# Patient Record
Sex: FEMALE | Race: BLACK OR AFRICAN AMERICAN | Employment: FULL TIME | URBAN - NONMETROPOLITAN AREA
[De-identification: names, ages, dates, MRNs, and addresses within clinical notes are randomized per-mention and may not be internally consistent; named-entity substitution may affect disease eponyms.]

---

## 2017-02-09 ENCOUNTER — OFFICE VISIT (OUTPATIENT)
Dept: UROLOGY | Age: 47
End: 2017-02-09

## 2017-02-09 VITALS
BODY MASS INDEX: 21.5 KG/M2 | HEIGHT: 67 IN | DIASTOLIC BLOOD PRESSURE: 70 MMHG | SYSTOLIC BLOOD PRESSURE: 122 MMHG | WEIGHT: 137 LBS

## 2017-02-09 DIAGNOSIS — N13.30 HYDRONEPHROSIS, UNSPECIFIED HYDRONEPHROSIS TYPE: Primary | ICD-10-CM

## 2017-02-09 DIAGNOSIS — R31.29 MICROSCOPIC HEMATURIA: ICD-10-CM

## 2017-02-09 LAB
BILIRUBIN URINE: NEGATIVE
BLOOD URINE, POC: ABNORMAL
CHARACTER, URINE: CLEAR
COLOR, URINE: YELLOW
GLUCOSE URINE: NEGATIVE MG/DL
KETONES, URINE: 15
LEUKOCYTE CLUMPS, URINE: NEGATIVE
NITRITE, URINE: NEGATIVE
PH, URINE: 5.5
PROTEIN, URINE: NEGATIVE MG/DL
SPECIFIC GRAVITY, URINE: 1.02 (ref 1–1.03)
UROBILINOGEN, URINE: 0.2 EU/DL

## 2017-02-09 PROCEDURE — 81003 URINALYSIS AUTO W/O SCOPE: CPT | Performed by: NURSE PRACTITIONER

## 2017-02-09 PROCEDURE — 99999 URINALYSIS WITH MICROSCOPIC: CPT | Performed by: NURSE PRACTITIONER

## 2017-02-09 PROCEDURE — 99213 OFFICE O/P EST LOW 20 MIN: CPT | Performed by: NURSE PRACTITIONER

## 2017-02-09 RX ORDER — LISINOPRIL 10 MG/1
10 TABLET ORAL DAILY
COMMUNITY
End: 2017-09-06 | Stop reason: SDUPTHER

## 2017-02-10 LAB
APPEARANCE: CLEAR
BACTERIA: NEGATIVE PER HPF
BILIRUBIN: NEGATIVE
COLOR: YELLOW
EPITHELIAL CELLS: ABNORMAL PER HPF
GLUCOSE BLD-MCNC: NEGATIVE MG/DL
KETONES, URINE: ABNORMAL
LEUKOCYTE ESTERASE, URINE: NEGATIVE
NITRITE, URINE: NEGATIVE
OCCULT BLOOD,URINE: ABNORMAL
PH: 5.5 (ref 5–9)
PROTEIN, URINE: NEGATIVE
RBC: ABNORMAL PER HPF (ref 0–5)
SP GRAVITY MISCELLANEOUS: 1.02 (ref 1–1.03)
UROBILINOGEN, URINE: NORMAL
WBC: 0 PER HPF (ref 0–5)

## 2017-02-17 ENCOUNTER — TELEPHONE (OUTPATIENT)
Dept: UROLOGY | Age: 47
End: 2017-02-17

## 2017-09-06 ENCOUNTER — OFFICE VISIT (OUTPATIENT)
Dept: FAMILY MEDICINE CLINIC | Age: 47
End: 2017-09-06
Payer: COMMERCIAL

## 2017-09-06 VITALS
TEMPERATURE: 98.3 F | SYSTOLIC BLOOD PRESSURE: 134 MMHG | DIASTOLIC BLOOD PRESSURE: 84 MMHG | HEART RATE: 92 BPM | HEIGHT: 66 IN | WEIGHT: 136.6 LBS | RESPIRATION RATE: 12 BRPM | BODY MASS INDEX: 21.95 KG/M2

## 2017-09-06 DIAGNOSIS — K21.9 GASTROESOPHAGEAL REFLUX DISEASE, ESOPHAGITIS PRESENCE NOT SPECIFIED: ICD-10-CM

## 2017-09-06 DIAGNOSIS — Z12.39 SCREENING BREAST EXAMINATION: ICD-10-CM

## 2017-09-06 DIAGNOSIS — I10 ESSENTIAL HYPERTENSION: Primary | ICD-10-CM

## 2017-09-06 DIAGNOSIS — R42 VERTIGO: ICD-10-CM

## 2017-09-06 PROCEDURE — 99203 OFFICE O/P NEW LOW 30 MIN: CPT | Performed by: NURSE PRACTITIONER

## 2017-09-06 RX ORDER — PROMETHAZINE HYDROCHLORIDE 25 MG/1
25 TABLET ORAL EVERY 6 HOURS PRN
Qty: 12 TABLET | Refills: 2 | Status: SHIPPED | OUTPATIENT
Start: 2017-09-06 | End: 2018-07-26

## 2017-09-06 RX ORDER — ASPIRIN 81 MG/1
81 TABLET ORAL DAILY
Qty: 30 TABLET | Refills: 11 | Status: SHIPPED | OUTPATIENT
Start: 2017-09-06 | End: 2018-07-26

## 2017-09-06 RX ORDER — B-COMPLEX WITH VITAMIN C
1 TABLET ORAL DAILY
Qty: 30 TABLET | Refills: 11 | Status: SHIPPED | OUTPATIENT
Start: 2017-09-06 | End: 2018-07-26

## 2017-09-06 RX ORDER — LISINOPRIL 10 MG/1
10 TABLET ORAL DAILY
Qty: 30 TABLET | Refills: 6 | Status: SHIPPED | OUTPATIENT
Start: 2017-09-06 | End: 2018-05-07 | Stop reason: SDUPTHER

## 2017-09-06 RX ORDER — PROMETHAZINE HYDROCHLORIDE 25 MG/1
25 TABLET ORAL EVERY 6 HOURS PRN
COMMUNITY
End: 2017-09-06 | Stop reason: SDUPTHER

## 2017-09-06 RX ORDER — MECLIZINE HCL 12.5 MG/1
12.5 TABLET ORAL 3 TIMES DAILY PRN
Qty: 60 TABLET | Refills: 0 | Status: SHIPPED | OUTPATIENT
Start: 2017-09-06 | End: 2021-10-04 | Stop reason: SDUPTHER

## 2017-09-06 RX ORDER — OMEPRAZOLE 20 MG/1
20 CAPSULE, DELAYED RELEASE ORAL DAILY
Qty: 30 CAPSULE | Refills: 6 | Status: SHIPPED | OUTPATIENT
Start: 2017-09-06 | End: 2017-09-20 | Stop reason: ALTCHOICE

## 2017-09-06 ASSESSMENT — PATIENT HEALTH QUESTIONNAIRE - PHQ9
2. FEELING DOWN, DEPRESSED OR HOPELESS: 0
SUM OF ALL RESPONSES TO PHQ9 QUESTIONS 1 & 2: 0
SUM OF ALL RESPONSES TO PHQ QUESTIONS 1-9: 0
1. LITTLE INTEREST OR PLEASURE IN DOING THINGS: 0

## 2017-09-06 ASSESSMENT — ENCOUNTER SYMPTOMS
BACK PAIN: 0
RHINORRHEA: 0
DIARRHEA: 0
NAUSEA: 1
ABDOMINAL DISTENTION: 0
RECTAL PAIN: 0
VOMITING: 0
RESPIRATORY NEGATIVE: 1
ABDOMINAL PAIN: 0

## 2017-09-20 ENCOUNTER — OFFICE VISIT (OUTPATIENT)
Dept: FAMILY MEDICINE CLINIC | Age: 47
End: 2017-09-20
Payer: COMMERCIAL

## 2017-09-20 VITALS
SYSTOLIC BLOOD PRESSURE: 136 MMHG | WEIGHT: 136 LBS | HEIGHT: 66 IN | TEMPERATURE: 98.1 F | DIASTOLIC BLOOD PRESSURE: 64 MMHG | BODY MASS INDEX: 21.86 KG/M2 | RESPIRATION RATE: 10 BRPM | HEART RATE: 100 BPM

## 2017-09-20 DIAGNOSIS — R11.0 NAUSEA: ICD-10-CM

## 2017-09-20 DIAGNOSIS — R14.2 BELCHING: ICD-10-CM

## 2017-09-20 DIAGNOSIS — R10.13 EPIGASTRIC ABDOMINAL PAIN: ICD-10-CM

## 2017-09-20 DIAGNOSIS — Z12.4 PAPANICOLAOU SMEAR FOR CERVICAL CANCER SCREENING: Primary | ICD-10-CM

## 2017-09-20 DIAGNOSIS — H00.11 CHALAZION OF RIGHT UPPER EYELID: ICD-10-CM

## 2017-09-20 DIAGNOSIS — K21.9 GASTROESOPHAGEAL REFLUX DISEASE, ESOPHAGITIS PRESENCE NOT SPECIFIED: ICD-10-CM

## 2017-09-20 PROCEDURE — 99396 PREV VISIT EST AGE 40-64: CPT | Performed by: NURSE PRACTITIONER

## 2017-09-20 RX ORDER — PANTOPRAZOLE SODIUM 20 MG/1
20 TABLET, DELAYED RELEASE ORAL DAILY
Qty: 30 TABLET | Refills: 3 | Status: SHIPPED | OUTPATIENT
Start: 2017-09-20 | End: 2018-07-26

## 2017-09-25 LAB — GYNECOLOGY CYTOLOGY REPORT: NORMAL

## 2017-09-26 ENCOUNTER — TELEPHONE (OUTPATIENT)
Dept: FAMILY MEDICINE CLINIC | Age: 47
End: 2017-09-26

## 2017-12-11 ENCOUNTER — TELEPHONE (OUTPATIENT)
Dept: FAMILY MEDICINE CLINIC | Age: 47
End: 2017-12-11

## 2017-12-11 NOTE — TELEPHONE ENCOUNTER
12/11/17 dolv:  9/20/17  Patient requesting appointment after 2:30 this week for diarrhea since 12/1/17. Please advise as schedule is full.   Thanks/blm

## 2017-12-12 ENCOUNTER — OFFICE VISIT (OUTPATIENT)
Dept: FAMILY MEDICINE CLINIC | Age: 47
End: 2017-12-12
Payer: COMMERCIAL

## 2017-12-12 ENCOUNTER — TELEPHONE (OUTPATIENT)
Dept: FAMILY MEDICINE CLINIC | Age: 47
End: 2017-12-12

## 2017-12-12 VITALS
TEMPERATURE: 98.5 F | BODY MASS INDEX: 21.86 KG/M2 | DIASTOLIC BLOOD PRESSURE: 80 MMHG | SYSTOLIC BLOOD PRESSURE: 136 MMHG | HEIGHT: 66 IN | RESPIRATION RATE: 14 BRPM | WEIGHT: 136 LBS | HEART RATE: 64 BPM

## 2017-12-12 DIAGNOSIS — R19.7 DIARRHEA, UNSPECIFIED TYPE: ICD-10-CM

## 2017-12-12 DIAGNOSIS — K52.9 ACUTE GASTROENTERITIS: Primary | ICD-10-CM

## 2017-12-12 DIAGNOSIS — R25.2 MUSCLE CRAMP: ICD-10-CM

## 2017-12-12 DIAGNOSIS — Z13.220 SCREENING CHOLESTEROL LEVEL: ICD-10-CM

## 2017-12-12 DIAGNOSIS — R11.0 NAUSEA: ICD-10-CM

## 2017-12-12 PROCEDURE — 99213 OFFICE O/P EST LOW 20 MIN: CPT | Performed by: NURSE PRACTITIONER

## 2017-12-12 ASSESSMENT — ENCOUNTER SYMPTOMS
CONSTIPATION: 0
NAUSEA: 1
DIARRHEA: 1
ABDOMINAL DISTENTION: 0
VOMITING: 0
RESPIRATORY NEGATIVE: 1
ABDOMINAL PAIN: 1

## 2017-12-12 NOTE — TELEPHONE ENCOUNTER
Pt scheduled today 12/12/17 at 5:00 pm with Jassi Banuelos. Per Justin's request, called pt to see if she can at this time, or when she receives the voice mail I left for her on her [address]   (ok to leave detailed msg on mach per signed HIPAA)

## 2017-12-12 NOTE — PROGRESS NOTES
[address]  [address] [address]  Dept: [phone]  Dept Fax: [phone]  Loc: [phone]    Gilmar Eldridge is a 49 y.o. female who presents today for her medical conditions/complaints as noted below. Gilmar Eldridge is c/o of Diarrhea (abd pain, diarrhea, nausea, gas started Dec 1 )      HPI:     Pt here with new onset diarrhea and nausea for the last 4 days. She states she started having symptoms 4 days ago. She denies a fever chills or sweats. She states she has not vomited. She is having muscle cramps in her legs. She did state this am she had a soft formed stool. She ate breakfast and lunch and kept them down and did not have diarrhea. She continues to have some nausea with stomach discomfort. She slept through the night last night without getting awakened by the diarrhea. She denies eating any place new or different. Current Outpatient Prescriptions   Medication Sig Dispense Refill    loperamide (IMODIUM) 1 MG/5ML solution Take 5 mLs by mouth 4 times daily as needed for Diarrhea 100 mL 0    pantoprazole (PROTONIX) 20 MG tablet Take 1 tablet by mouth daily 30 tablet 3    Erythromycin 2 % OINT Apply 1 applicator topically 2 times daily 25 g 1    promethazine (PHENERGAN) 25 MG tablet Take 1 tablet by mouth every 6 hours as needed for Nausea 12 tablet 2    meclizine (ANTIVERT) 12.5 MG tablet Take 1 tablet by mouth 3 times daily as needed (vertigo) 60 tablet 0    lisinopril (PRINIVIL;ZESTRIL) 10 MG tablet Take 1 tablet by mouth daily 30 tablet 6    Calcium Carbonate-Vitamin D (OYSTER SHELL CALCIUM/D) 500-200 MG-UNIT TABS Take 1 tablet by mouth daily 30 tablet 11    aspirin EC 81 MG EC tablet Take 1 tablet by mouth daily 30 tablet 11    lidocaine (XYLOCAINE) 5 % ointment Apply topically as needed.  50 g 0    Probiotic Product (PROBIOTIC DAILY PO) Take by mouth       No current facility-administered medications for this visit. Past Medical History:   Diagnosis Date    Acid reflux     Gouty arthritis     Hypertension       Past Surgical History:   Procedure Laterality Date    FINGER TRIGGER RELEASE      INNER EAR SURGERY  2015    PARTIAL HYSTERECTOMY       Family History   Problem Relation Age of Onset    Cancer Mother     Heart Disease Father      Social History   Substance Use Topics    Smoking status: Never Smoker    Smokeless tobacco: Not on file    Alcohol use Not on file        No Known Allergies    Health Maintenance   Topic Date Due    HIV screen  09/07/1984    Lipid screen  09/07/2009    Flu vaccine (1) 09/01/2017    Cervical cancer screen  09/25/2020    DTaP/Tdap/Td vaccine (2 - Td) 04/06/2024       Subjective:      Review of Systems   Respiratory: Negative. Cardiovascular: Negative. Gastrointestinal: Positive for abdominal pain, diarrhea and nausea. Negative for abdominal distention, constipation and vomiting. Skin: Negative. Psychiatric/Behavioral: Negative for self-injury, sleep disturbance and suicidal ideas. Objective:     /80   Pulse 64   Temp 98.5 °F (36.9 °C) (Oral)   Resp 14   Ht 5' 6\" (1.676 m)   Wt 136 lb (61.7 kg)   BMI 21.95 kg/m²     Physical Exam   Constitutional: She appears well-developed and well-nourished. No distress. Cardiovascular: Normal rate, regular rhythm, normal heart sounds and intact distal pulses. No murmur heard. Pulmonary/Chest: Effort normal and breath sounds normal.   Abdominal: Normal appearance. She exhibits no shifting dullness, no distension, no pulsatile liver, no fluid wave, no abdominal bruit, no ascites, no pulsatile midline mass and no mass. Bowel sounds are increased. There is no tenderness. There is no rigidity, no guarding, no tenderness at McBurney's point and negative Woods's sign. Skin:   Mucous membranes moist, skin turgor 2+ no tenting noted   Nursing note and vitals reviewed. Assessment/Plan:          1. Acute gastroenteritis  BRAT diet, continue protonix  Eat yogurt    2. Nausea  Nausea meds if needed    3. Diarrhea, unspecified type    - loperamide (IMODIUM) 1 MG/5ML solution; Take 5 mLs by mouth 4 times daily as needed for Diarrhea  Dispense: 100 mL; Refill: 0  If symptoms no better in 2 days contact office. 4. Muscle cramp  Continue to keep hydrated with fluid intake    Patient Instructions     You may receive a survey about your visit with us today. The feedback from our patients helps us identify what is working well and where the service to all patients can be enhanced. Thank you! Patient Education        Colitis: Care Instructions  Your Care Instructions  Colitis is the medical term for swelling (inflammation) of the intestine. It can be caused by different things, such as an infection or loss of blood flow in the intestine. Other causes are problems like Crohn's disease or ulcerative colitis. Symptoms may include fever, diarrhea that may be bloody, or belly pain. Sometimes symptoms go away without treatment. But you may need treatment or more tests, such as blood tests or a stool test. Or you may need imaging tests like a CT scan or a colonoscopy. In some cases, the doctor may want to test a sample of tissue from the intestine. This test is called a biopsy. The doctor has checked you carefully, but problems can develop later. If you notice any problems or new symptoms, get medical treatment right away. Follow-up care is a key part of your treatment and safety. Be sure to make and go to all appointments, and call your doctor if you are having problems. It's also a good idea to know your test results and keep a list of the medicines you take. How can you care for yourself at home? · Rest until you feel better. · Your doctor may recommend that you eat bland foods. These include rice, dry toast or crackers, bananas, and applesauce. · To prevent dehydration, drink plenty of fluids.  Choose water and other caffeine-free clear liquids until you feel better. If you have kidney, heart, or liver disease and have to limit fluids, talk with your doctor before you increase the amount of fluids you drink. · Be safe with medicines. Take your medicines exactly as prescribed. Call your doctor if you think you are having a problem with your medicine. You will get more details on the specific medicines your doctor prescribes. When should you call for help? Call 911 anytime you think you may need emergency care. For example, call if:  ? · You passed out (lost consciousness). ? · Your stools are maroon or very bloody. ?Call your doctor now or seek immediate medical care if:  ? · You have new or worse belly pain. ? · You have a fever. ? · You are vomiting. ? · You cannot pass stools or gas. ? · You have new or more blood in your stools. ? Watch closely for changes in your health, and be sure to contact your doctor if:  ? · You have new or worse symptoms. ? · You are losing weight. ? · You do not get better as expected. Where can you learn more? Go to https://[employer]. org and sign in to your [employer] account. Enter H027 in the XZERES box to learn more about \"Colitis: Care Instructions. \"     If you do not have an account, please click on the \"Sign Up Now\" link. Current as of: May 12, 2017  Content Version: 11.4  © 5868-1257 [employer]. Care instructions adapted under license by [facility] ([facility]). If you have questions about a medical condition or this instruction, always ask your healthcare professional. [place] [address] any warranty or liability for your use of this information. Advised to eat a BRAT diet and eat yogurt and drink warm coke. Return if symptoms worsen or fail to improve.     Reccommended tobacco cessation options including pharmacologic methods, counseled great than 3 minutes during this visit:  Yes  []  No  [] Patient given educational materials - see patient instructions. Discussed use, benefit, and side effects of prescribed medications. All patient questions answered. Pt voiced understanding. Reviewed health maintenance. Instructed to continue current medications, diet and exercise. Patient agreed with treatment plan. Follow up as directed.        Electronically signed by Radha Londono CNP on 12/12/2017 at 5:15 PM

## 2017-12-12 NOTE — LETTER
[address]  [address] [address]  Phone: [phone]  Fax: [phone]    Aspen Gamboa CNP        December 12, 2017     Patient: Arturo Díaz   YOB: 1970   Date of Visit: 12/12/2017       To Whom It May Concern: It is my medical opinion that Levi Eva please excuse patient from work on Wednesday December 13th, 2017. .    If you have any questions or concerns, please don't hesitate to call.     Sincerely,        Aspen Gamboa CNP

## 2017-12-14 ENCOUNTER — TELEPHONE (OUTPATIENT)
Dept: FAMILY MEDICINE CLINIC | Age: 47
End: 2017-12-14

## 2017-12-14 NOTE — LETTER
[address] [address]  Phone: [phone]  Fax: [phone]    Sulma Parker CNP        December 14, 2017     Patient: Floridalma Rodas   YOB: 1970   Date of Visit: 12/14/2017       To Whom It May Concern: It is my medical opinion that Solange Peraza please excuse patient from work on Thursday December 14th, and Friday December 15th, 2017. She may return t owork on Monday December 18th, 2017. If you have any questions or concerns, please don't hesitate to call.     Sincerely,        Sulma Parker CNP

## 2017-12-14 NOTE — TELEPHONE ENCOUNTER
Calling back to update sx  Still have some sx  But are getting better, requesting  Work slip for  thur  And fri day off 12/14/17/ an d 12/15 and return to work on Monday fx: [phone] [facility].

## 2017-12-18 ENCOUNTER — TELEPHONE (OUTPATIENT)
Dept: FAMILY MEDICINE CLINIC | Age: 47
End: 2017-12-18

## 2017-12-18 DIAGNOSIS — R19.7 DIARRHEA OF PRESUMED INFECTIOUS ORIGIN: Primary | ICD-10-CM

## 2017-12-18 NOTE — LETTER
[address]  [address] [address]  Phone: [phone]  Fax: [phone]    Marni Leal        December 18, 2017     Patient: Devang Hercules   YOB: 1970   Date of Visit: 12/18/2017       To Whom it May Concern:    Zach Osuna was seen in my clinic on 12/12/17. She may return to work on 12/20/17. If you have any questions or concerns, please don't hesitate to call.     Sincerely,         Justin Joya,CNP

## 2017-12-19 ENCOUNTER — TELEPHONE (OUTPATIENT)
Dept: FAMILY MEDICINE CLINIC | Age: 47
End: 2017-12-19

## 2017-12-19 LAB
ABSOLUTE BASO #: 0.1 K/UL (ref 0–0.1)
ABSOLUTE EOS #: 0.8 K/UL (ref 0.1–0.4)
ABSOLUTE LYMPH #: 2.3 K/UL (ref 0.8–5.2)
ABSOLUTE MONO #: 0.3 K/UL (ref 0.1–0.9)
ABSOLUTE NEUT #: 1.6 K/UL (ref 1.3–9.1)
ANION GAP SERPL CALCULATED.3IONS-SCNC: 18 MMOL/L
BASOPHILS RELATIVE PERCENT: 1.2 %
BUN BLDV-MCNC: 5 MG/DL (ref 10–20)
CALCIUM SERPL-MCNC: 10.1 MG/DL (ref 8.7–10.8)
CHLORIDE BLD-SCNC: 103 MMOL/L (ref 95–111)
CO2: 29 MMOL/L (ref 21–32)
CREAT SERPL-MCNC: 0.7 MG/DL (ref 0.5–1.3)
EGFR AFRICAN AMERICAN: 108
EGFR IF NONAFRICAN AMERICAN: 89
EOSINOPHILS RELATIVE PERCENT: 15.7 %
GLUCOSE: 83 MG/DL (ref 70–100)
HCT VFR BLD CALC: 38.7 % (ref 36–48)
HEMOGLOBIN: 12.9 G/DL (ref 12–16)
LYMPHOCYTE %: 44.2 %
MCH RBC QN AUTO: 27.4 PG (ref 27–34)
MCHC RBC AUTO-ENTMCNC: 33.3 G/DL (ref 31–36)
MCV RBC AUTO: 82.2 FL (ref 80–100)
MONOCYTES # BLD: 6.7 %
NEUTROPHILS RELATIVE PERCENT: 31.8 %
PDW BLD-RTO: 13.7 % (ref 10.8–14.8)
PLATELETS: 261 K/UL (ref 150–450)
POTASSIUM SERPL-SCNC: 3.8 MMOL/L (ref 3.5–5.4)
RBC: 4.71 M/UL (ref 4–5.5)
SODIUM BLD-SCNC: 146 MMOL/L (ref 134–147)
WBC: 5.1 K/UL (ref 3.7–10.8)

## 2017-12-21 ENCOUNTER — TELEPHONE (OUTPATIENT)
Dept: FAMILY MEDICINE CLINIC | Age: 47
End: 2017-12-21

## 2017-12-21 ENCOUNTER — HOSPITAL ENCOUNTER (OUTPATIENT)
Age: 47
Discharge: HOME | End: 2017-12-21
Payer: COMMERCIAL

## 2017-12-21 LAB
ADENOVIRUS F 40 41 PCR: NOT DETECTED
ASTROVIRUS PCR: NOT DETECTED
CAMPYLOBACTER PCR: NOT DETECTED
CLOSTRIDIUM DIFFICILE, PCR: NOT DETECTED
CRYPTOSPORIDIUM PCR: NOT DETECTED
CYCLOSPORA CAYETANENSIS PCR: NOT DETECTED
E COLI 0157 PCR: NORMAL
E COLI ENTEROAGGREGATIVE PCR: NOT DETECTED
E COLI ENTEROPATHOGENIC PCR: NOT DETECTED
E COLI ENTEROTOXIGENIC PCR: NOT DETECTED
E COLI SHIGA LIKE TOXIN PCR: NOT DETECTED
E COLI SHIGELLA/ENTEROINVASIVE PCR: NOT DETECTED
E HISTOLYTICA GI FILM ARRAY: NOT DETECTED
GIARDIA LAMBLIA PCR: NOT DETECTED
NOROVIRUS GI GII PCR: NOT DETECTED
PLESIOMONAS SHIGELLOIDES PCR: NOT DETECTED
ROTAVIRUS A PCR: NOT DETECTED
SALMONELLA PCR: NOT DETECTED
SAPOVIRUS PCR: NOT DETECTED
VIBRIO CHOLERAE PCR: NOT DETECTED
VIBRIO PCR: NOT DETECTED
YERSINIA ENTEROCOLITICA PCR: NOT DETECTED

## 2017-12-21 PROCEDURE — 87507 IADNA-DNA/RNA PROBE TQ 12-25: CPT

## 2017-12-21 NOTE — TELEPHONE ENCOUNTER
Pt called in stating that she was going to go back to work today,but she was up all night again with diarrhea. She is requesting that Justin write her 1 more note to excuse her from work tonight and she will return tomorrow. Pt also states that she took her stool culture to the lab today so her results should be in soon. Please advise.        Letter can be faxed to her work attn: Raysa Durán @[phone]

## 2018-03-21 ENCOUNTER — OFFICE VISIT (OUTPATIENT)
Dept: UROLOGY | Age: 48
End: 2018-03-21
Payer: COMMERCIAL

## 2018-03-21 VITALS
SYSTOLIC BLOOD PRESSURE: 122 MMHG | HEIGHT: 67 IN | WEIGHT: 134.8 LBS | BODY MASS INDEX: 21.16 KG/M2 | DIASTOLIC BLOOD PRESSURE: 76 MMHG

## 2018-03-21 DIAGNOSIS — R31.29 MICROSCOPIC HEMATURIA: ICD-10-CM

## 2018-03-21 DIAGNOSIS — N13.30 HYDRONEPHROSIS, UNSPECIFIED HYDRONEPHROSIS TYPE: Primary | ICD-10-CM

## 2018-03-21 LAB
BILIRUBIN URINE: NEGATIVE
BLOOD URINE, POC: ABNORMAL
CHARACTER, URINE: CLEAR
COLOR, URINE: YELLOW
GLUCOSE URINE: NEGATIVE MG/DL
KETONES, URINE: ABNORMAL
LEUKOCYTE CLUMPS, URINE: ABNORMAL
NITRITE, URINE: NEGATIVE
PH, URINE: 6
PROTEIN, URINE: NEGATIVE MG/DL
SPECIFIC GRAVITY, URINE: 1.02 (ref 1–1.03)
UROBILINOGEN, URINE: 1 EU/DL

## 2018-03-21 PROCEDURE — 81003 URINALYSIS AUTO W/O SCOPE: CPT | Performed by: NURSE PRACTITIONER

## 2018-03-21 PROCEDURE — 99213 OFFICE O/P EST LOW 20 MIN: CPT | Performed by: NURSE PRACTITIONER

## 2018-03-21 NOTE — PROGRESS NOTES
Radial pulses are 2+/4 bilateral and equal. Posterior tibialis 2+/4 bilateral and equal  Pulmonary/Chest:      Chest symmetric with normal A/P diameter,  CTA with no wheezes, rales, or rhonchi noted. Normal respiratory rate and rhthym. No use of accessory muscles. Abdominal:         Soft. No tenderness. No rebound, no guarding and no CVA tenderness. Bowel sounds present. Musculoskeletal:         Normal range of motion. No edema or tenderness of lower extremities. Lymphadenopathy:        No cervical adenopathy. Bilateral supraclavicular adenopathy absent. Extremities: No cyanosis, clubbing, or edema present. Neurological:        Alert and oriented. No cranial nerve deficit. There are no focalizing motor or sensory deficits. CN II-XII are grossly intact. Psychiatric:        Normal mood and affect. Labs   Urine dip demonstrates   Results for POC orders placed in visit on 03/21/18   POCT Urinalysis No Micro (Auto)   Result Value Ref Range    Glucose, Ur Negative NEGATIVE mg/dl    Bilirubin Urine Negative     Ketones, Urine Trace (A) NEGATIVE    Specific Gravity, Urine 1.020 1.002 - 1.03    Blood, UA POC Small (A) NEGATIVE    pH, Urine 6.00 5.0 - 9.0    Protein, Urine Negative NEGATIVE mg/dl    Urobilinogen, Urine 1.00 0.0 - 1.0 eu/dl    Nitrite, Urine Negative NEGATIVE    Leukocyte Clumps, Urine Trace (A) NEGATIVE    Color, Urine Yellow YELLOW-STR    Character, Urine Clear [id number]       Patients recent PSA values are as follows  No results found for: PSA, PSADIA     Recent BUN/Creatinine:  Lab Results   Component Value Date    BUN 5 12/18/2017    CREATININE 0.7 12/18/2017            Radiology  The patient has had a Renal Ultrasound which I have reviewed accompanying report. The study demonstrates right pelvic kidney. No hydronephrosis.       information found for this patient   Narrative   Ordering Provider: Nayla Hare [facility]   Dee [address]          Radiology

## 2018-03-22 ENCOUNTER — TELEPHONE (OUTPATIENT)
Dept: UROLOGY | Age: 48
End: 2018-03-22

## 2018-03-22 LAB
APPEARANCE: CLEAR
BILIRUBIN: NEGATIVE
COLOR: YELLOW
GLUCOSE BLD-MCNC: NEGATIVE MG/DL
KETONES, URINE: NEGATIVE
LEUKOCYTE ESTERASE, URINE: NEGATIVE
NITRITE, URINE: NEGATIVE
OCCULT BLOOD,URINE: NEGATIVE
PH: 6.5 (ref 5–9)
PROTEIN, URINE: NEGATIVE
SP GRAVITY MISCELLANEOUS: 1.02 (ref 1–1.03)
UROBILINOGEN, URINE: NORMAL

## 2018-03-22 NOTE — TELEPHONE ENCOUNTER
Patient [facility] Patient voiced understanding and was very appreciative of the call back.

## 2018-05-07 ENCOUNTER — OFFICE VISIT (OUTPATIENT)
Dept: FAMILY MEDICINE CLINIC | Age: 48
End: 2018-05-07
Payer: COMMERCIAL

## 2018-05-07 VITALS
WEIGHT: 129 LBS | RESPIRATION RATE: 12 BRPM | SYSTOLIC BLOOD PRESSURE: 134 MMHG | TEMPERATURE: 98.4 F | BODY MASS INDEX: 20.73 KG/M2 | HEIGHT: 66 IN | DIASTOLIC BLOOD PRESSURE: 82 MMHG | HEART RATE: 54 BPM

## 2018-05-07 DIAGNOSIS — Z11.4 SCREENING FOR HIV (HUMAN IMMUNODEFICIENCY VIRUS): Primary | ICD-10-CM

## 2018-05-07 DIAGNOSIS — I10 ESSENTIAL HYPERTENSION: ICD-10-CM

## 2018-05-07 PROCEDURE — 99213 OFFICE O/P EST LOW 20 MIN: CPT | Performed by: NURSE PRACTITIONER

## 2018-05-07 RX ORDER — LISINOPRIL 10 MG/1
10 TABLET ORAL DAILY
Qty: 30 TABLET | Refills: 6 | Status: SHIPPED | OUTPATIENT
Start: 2018-05-07 | End: 2018-11-20 | Stop reason: SDUPTHER

## 2018-05-07 ASSESSMENT — ENCOUNTER SYMPTOMS: RESPIRATORY NEGATIVE: 1

## 2018-05-09 ENCOUNTER — TELEPHONE (OUTPATIENT)
Dept: FAMILY MEDICINE CLINIC | Age: 48
End: 2018-05-09

## 2018-05-09 LAB
CHOLESTEROL/HDL RATIO: 2.3
CHOLESTEROL: 221 MG/DL
HDLC SERPL-MCNC: 97 MG/DL
HIV 1,2 COMBO ANTIGEN/ANTIBODY: NORMAL
LDL CHOLESTEROL CALCULATED: 117 MG/DL
LDL/HDL RATIO: 1.2
TRIGL SERPL-MCNC: 35 MG/DL
VLDLC SERPL CALC-MCNC: 7 MG/DL

## 2018-07-26 ENCOUNTER — HOSPITAL ENCOUNTER (EMERGENCY)
Dept: GENERAL RADIOLOGY | Age: 48
Discharge: HOME | End: 2018-07-26
Payer: COMMERCIAL

## 2018-07-26 ENCOUNTER — HOSPITAL ENCOUNTER (EMERGENCY)
Age: 48
Discharge: HOME | End: 2018-07-26
Payer: COMMERCIAL

## 2018-07-26 VITALS
HEART RATE: 68 BPM | DIASTOLIC BLOOD PRESSURE: 63 MMHG | TEMPERATURE: 97.3 F | HEIGHT: 67 IN | OXYGEN SATURATION: 98 % | SYSTOLIC BLOOD PRESSURE: 132 MMHG | BODY MASS INDEX: 20.43 KG/M2 | RESPIRATION RATE: 18 BRPM | WEIGHT: 130.13 LBS

## 2018-07-26 DIAGNOSIS — S86.911A KNEE STRAIN, RIGHT, INITIAL ENCOUNTER: Primary | ICD-10-CM

## 2018-07-26 PROCEDURE — 73564 X-RAY EXAM KNEE 4 OR MORE: CPT

## 2018-07-26 PROCEDURE — 99213 OFFICE O/P EST LOW 20 MIN: CPT | Performed by: NURSE PRACTITIONER

## 2018-07-26 PROCEDURE — 2709999900 HC NON-CHARGEABLE SUPPLY

## 2018-07-26 PROCEDURE — 99214 OFFICE O/P EST MOD 30 MIN: CPT

## 2018-07-26 RX ORDER — IBUPROFEN 400 MG/1
800 TABLET ORAL EVERY 8 HOURS PRN
Qty: 120 TABLET | Refills: 0 | Status: SHIPPED | OUTPATIENT
Start: 2018-07-26 | End: 2018-08-20 | Stop reason: ALTCHOICE

## 2018-07-26 ASSESSMENT — ENCOUNTER SYMPTOMS
CONSTIPATION: 0
RHINORRHEA: 0
BACK PAIN: 0
APNEA: 0
VOMITING: 0
SINUS PRESSURE: 0
COUGH: 0
SHORTNESS OF BREATH: 0
DIARRHEA: 0
SORE THROAT: 0
ABDOMINAL PAIN: 0
PHOTOPHOBIA: 0
NAUSEA: 0

## 2018-07-26 ASSESSMENT — PAIN DESCRIPTION - DESCRIPTORS: DESCRIPTORS: ACHING

## 2018-07-26 ASSESSMENT — PAIN DESCRIPTION - LOCATION: LOCATION: KNEE

## 2018-07-26 ASSESSMENT — PAIN DESCRIPTION - ORIENTATION: ORIENTATION: LEFT

## 2018-07-26 ASSESSMENT — PAIN SCALES - GENERAL: PAINLEVEL_OUTOF10: 7

## 2018-07-26 NOTE — ED TRIAGE NOTES
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 Pt rates left knee pain 6/10 at rest. Pt also has  knee swelling.

## 2018-07-26 NOTE — ED NOTES
An After Visit Summary was printed, reviewed with pt and given to. Pt informed that rx's at pharmacy ready for . 3 inch ace wrap applied to right knee upon discharge.      Alanna Hartman, KAYEN  48/96/46 [phone]

## 2018-08-20 ENCOUNTER — OFFICE VISIT (OUTPATIENT)
Dept: FAMILY MEDICINE CLINIC | Age: 48
End: 2018-08-20
Payer: COMMERCIAL

## 2018-08-20 VITALS
TEMPERATURE: 98.5 F | HEART RATE: 72 BPM | SYSTOLIC BLOOD PRESSURE: 150 MMHG | DIASTOLIC BLOOD PRESSURE: 84 MMHG | RESPIRATION RATE: 16 BRPM | WEIGHT: 135.8 LBS | BODY MASS INDEX: 21.27 KG/M2

## 2018-08-20 DIAGNOSIS — R53.83 OTHER FATIGUE: Primary | ICD-10-CM

## 2018-08-20 DIAGNOSIS — H57.89 EYE REDNESS: ICD-10-CM

## 2018-08-20 DIAGNOSIS — R25.1 TREMOR OF BOTH HANDS: ICD-10-CM

## 2018-08-20 DIAGNOSIS — R42 VERTIGO: ICD-10-CM

## 2018-08-20 DIAGNOSIS — L65.9 HAIR LOSS: ICD-10-CM

## 2018-08-20 PROCEDURE — 99213 OFFICE O/P EST LOW 20 MIN: CPT | Performed by: NURSE PRACTITIONER

## 2018-08-20 RX ORDER — ACETAMINOPHEN 500 MG
1000 TABLET ORAL PRN
COMMUNITY
End: 2019-08-01 | Stop reason: ALTCHOICE

## 2018-08-20 ASSESSMENT — ENCOUNTER SYMPTOMS
RESPIRATORY NEGATIVE: 1
ABDOMINAL DISTENTION: 0

## 2018-08-20 NOTE — PROGRESS NOTES
[address]  [address] [address]  Dept: [phone]  Dept Fax: [phone]  Loc: [phone]    Ghanshyam Cedeño is a 49 y.o. female who presents today for her medical conditions/complaints as noted below. Ghanshyam Cedeño is c/o of Fatigue (C/o feeling \"extremely tired\" since last Tuesday. Also c/o losing hair more over the last 6 months.); Anxiety (C/o feeling nervous \"for no reason\", occurs during the day and at night sometimes. Also notes feeling SOB and \"feeling in a fog, hard to concentrate sometimes\".  ); Tremors (C/o occasional hand tremors.  ); Dizziness (C/o feeling dizzy on and off since last Tuesday, in addition to intermittent Vertigo symptoms.); and Eye Problem (C/o Right eye redness x 1 week. Denies pain, drainage or itching.  )      HPI:     Pt here with multiple complaints today. She states her hair is falling out in clumps,  Denies any hair loss of scalp, she has been feeling nervous lately and having hand tremors and feeling dizzy off and on. She states the symptoms started last week. She is concerned about her thyroid gland and also she has felt fatigued. She will have to come home after work and take a a nap some day. denies any blood loss anywhere. Her inner right eye has bee red. Not itching, nothing in it, draining clear, not matted shut in am.         Current Outpatient Prescriptions   Medication Sig Dispense Refill    acetaminophen (APAP EXTRA STRENGTH) 500 MG tablet Take 1,000 mg by mouth as needed for Pain      lisinopril (PRINIVIL;ZESTRIL) 10 MG tablet Take 1 tablet by mouth daily 30 tablet 6    meclizine (ANTIVERT) 12.5 MG tablet Take 1 tablet by mouth 3 times daily as needed (vertigo) 60 tablet 0     No current facility-administered medications for this visit.         Past Medical History:   Diagnosis Date    Acid reflux     Gouty arthritis     Hypertension       Past Surgical History: Procedure Laterality Date    FINGER TRIGGER RELEASE      INNER EAR SURGERY  2015    PARTIAL HYSTERECTOMY      UPPER GASTROINTESTINAL ENDOSCOPY  10/06/2017     Family History   Problem Relation Age of Onset    Cancer Mother     Heart Disease Father      Social History   Substance Use Topics    Smoking status: Never Smoker    Smokeless tobacco: Never Used    Alcohol use No        No Known Allergies    Health Maintenance   Topic Date Due    Flu vaccine (1) 09/01/2018    Potassium monitoring  12/18/2018    Creatinine monitoring  12/18/2018    Cervical cancer screen  09/25/2020    Lipid screen  05/08/2023    DTaP/Tdap/Td vaccine (2 - Td) 04/06/2024    HIV screen  Completed       Subjective:      Review of Systems   Constitutional: Positive for fatigue. Negative for diaphoresis and fever. Respiratory: Negative. Cardiovascular: Negative. Gastrointestinal: Negative for abdominal distention. Genitourinary: Negative for difficulty urinating, dyspareunia and menstrual problem. Musculoskeletal: Negative for arthralgias. Skin: Negative. Neurological: Positive for dizziness, tremors and numbness. Negative for seizures, syncope, facial asymmetry, speech difficulty, weakness, light-headedness and headaches. Psychiatric/Behavioral: Negative for self-injury, sleep disturbance and suicidal ideas. The patient is nervous/anxious and is hyperactive. Objective:     BP (!) 150/84 (Site: Left Arm, Position: Sitting, Cuff Size: Medium Adult)   Pulse 72   Temp 98.5 °F (36.9 °C) (Oral)   Resp 16   Wt 135 lb 12.8 oz (61.6 kg)   BMI 21.27 kg/m²     Physical Exam   Constitutional: She appears well-developed and well-nourished. No distress.    HENT:   Right Ear: Hearing, tympanic membrane, external ear and ear canal normal.   Left Ear: Hearing, tympanic membrane, external ear and ear canal normal.   Nose: Nose normal.   Mouth/Throat: Oropharynx is clear and moist.   Scalp with a few bald spots at base of neck, no circular pattern noted, no itching noted   Eyes: Pupils are equal, round, and reactive to light. Conjunctivae and EOM are normal.   Right inner sclera with erythema, upper and lower eyelid normal no erythema or edema. Cardiovascular: Normal rate, regular rhythm, normal heart sounds and intact distal pulses. No murmur heard. Pulmonary/Chest: Effort normal and breath sounds normal. No respiratory distress. She has no wheezes. Abdominal: Soft. Bowel sounds are normal. She exhibits no distension. Skin: Skin is warm and dry. Psychiatric: Judgment and thought content normal. Her mood appears anxious. Her speech is rapid and/or pressured. She is hyperactive. Cognition and memory are normal.   Nursing note and vitals reviewed. Assessment/Plan:          1. Other fatigue    - CBC Auto Differential; Future  - Comprehensive Metabolic Panel; Future  - Lipid Panel; Future  - Vitamin B12 & Folate; Future  - Vitamin D 25 Hydroxy; Future  - TSH without Reflex; Future  - T4, Free; Future  - Iron; Future  - IRON SATURATION; Future  - Ferritin; Future  - Vitamin B6; Future  - Total Iron Binding Capacity; Future    2. Tremor of both hands    - Comprehensive Metabolic Panel; Future  - Lipid Panel; Future  - Vitamin B12 & Folate; Future  - TSH without Reflex; Future  - T4, Free; Future  - Ferritin; Future  - Vitamin B6; Future    3. Hair loss    - Comprehensive Metabolic Panel; Future  - Vitamin B12 & Folate; Future  - TSH without Reflex; Future  - T4, Free; Future    4. Vertigo    Monitor  Take antivert if needed  Await results  5. Eye redness    Visine to right eye    Return in about 2 weeks (around 9/3/2018) for to discuss test results. Reccommended tobacco cessation options including pharmacologic methods, counseled great than 3 minutes during this visit:  Yes  []  No  []       Patient given educational materials - see patient instructions.   Discussed use, benefit, and side effects of prescribed medications. All patient questions answered. Pt voiced understanding. Reviewed health maintenance. Instructed to continue current medications, diet and exercise. Patient agreed with treatment plan. Follow up as directed.        Electronically signed by JOON Bowman CNP on 8/20/2018 at 12:55 PM

## 2018-08-21 ENCOUNTER — NURSE ONLY (OUTPATIENT)
Dept: LAB | Age: 48
End: 2018-08-21

## 2018-08-21 DIAGNOSIS — R53.83 OTHER FATIGUE: ICD-10-CM

## 2018-08-21 DIAGNOSIS — R25.1 TREMOR OF BOTH HANDS: ICD-10-CM

## 2018-08-21 DIAGNOSIS — L65.9 HAIR LOSS: ICD-10-CM

## 2018-08-21 LAB
ALBUMIN SERPL-MCNC: 5 G/DL (ref 3.5–5.1)
ALP BLD-CCNC: 62 U/L (ref 38–126)
ALT SERPL-CCNC: 11 U/L (ref 11–66)
ANION GAP SERPL CALCULATED.3IONS-SCNC: 13 MEQ/L (ref 8–16)
AST SERPL-CCNC: 18 U/L (ref 5–40)
BASOPHILS # BLD: 1.2 %
BASOPHILS ABSOLUTE: 0.1 THOU/MM3 (ref 0–0.1)
BILIRUB SERPL-MCNC: 0.5 MG/DL (ref 0.3–1.2)
BUN BLDV-MCNC: 7 MG/DL (ref 7–22)
CALCIUM SERPL-MCNC: 10.2 MG/DL (ref 8.5–10.5)
CHLORIDE BLD-SCNC: 100 MEQ/L (ref 98–111)
CHOLESTEROL, TOTAL: 207 MG/DL (ref 100–199)
CO2: 28 MEQ/L (ref 23–33)
CREAT SERPL-MCNC: 0.5 MG/DL (ref 0.4–1.2)
EOSINOPHIL # BLD: 1.6 %
EOSINOPHILS ABSOLUTE: 0.1 THOU/MM3 (ref 0–0.4)
ERYTHROCYTE [DISTWIDTH] IN BLOOD BY AUTOMATED COUNT: 14.6 % (ref 11.5–14.5)
ERYTHROCYTE [DISTWIDTH] IN BLOOD BY AUTOMATED COUNT: 45.1 FL (ref 35–45)
FERRITIN: 103 NG/ML (ref 10–291)
FOLATE: 15.4 NG/ML (ref 4.8–24.2)
GFR SERPL CREATININE-BSD FRML MDRD: > 90 ML/MIN/1.73M2
GLUCOSE BLD-MCNC: 75 MG/DL (ref 70–108)
HCT VFR BLD CALC: 37 % (ref 37–47)
HDLC SERPL-MCNC: 100 MG/DL
HEMOGLOBIN: 11.9 GM/DL (ref 12–16)
IMMATURE GRANS (ABS): 0.01 THOU/MM3 (ref 0–0.07)
IMMATURE GRANULOCYTES: 0.2 %
IRON SATURATION: 16 % (ref 20–50)
IRON: 63 UG/DL (ref 50–170)
LDL CHOLESTEROL CALCULATED: 99 MG/DL
LYMPHOCYTES # BLD: 46.7 %
LYMPHOCYTES ABSOLUTE: 2 THOU/MM3 (ref 1–4.8)
MCH RBC QN AUTO: 27.4 PG (ref 26–33)
MCHC RBC AUTO-ENTMCNC: 32.2 GM/DL (ref 32.2–35.5)
MCV RBC AUTO: 85.1 FL (ref 81–99)
MONOCYTES # BLD: 7.4 %
MONOCYTES ABSOLUTE: 0.3 THOU/MM3 (ref 0.4–1.3)
NUCLEATED RED BLOOD CELLS: 0 /100 WBC
PLATELET # BLD: 259 THOU/MM3 (ref 130–400)
PMV BLD AUTO: 9.2 FL (ref 9.4–12.4)
POTASSIUM SERPL-SCNC: 4.5 MEQ/L (ref 3.5–5.2)
RBC # BLD: 4.35 MILL/MM3 (ref 4.2–5.4)
SEG NEUTROPHILS: 42.9 %
SEGMENTED NEUTROPHILS ABSOLUTE COUNT: 1.8 THOU/MM3 (ref 1.8–7.7)
SODIUM BLD-SCNC: 141 MEQ/L (ref 135–145)
T4 FREE: 1.13 NG/DL (ref 0.93–1.76)
TOTAL IRON BINDING CAPACITY: 405 UG/DL (ref 171–450)
TOTAL PROTEIN: 7.9 G/DL (ref 6.1–8)
TRIGL SERPL-MCNC: 39 MG/DL (ref 0–199)
TSH SERPL DL<=0.05 MIU/L-ACNC: 1.25 UIU/ML (ref 0.4–4.2)
VITAMIN B-12: 334 PG/ML (ref 211–911)
VITAMIN D 25-HYDROXY: 9 NG/ML (ref 30–100)
WBC # BLD: 4.3 THOU/MM3 (ref 4.8–10.8)

## 2018-08-22 ENCOUNTER — TELEPHONE (OUTPATIENT)
Dept: FAMILY MEDICINE CLINIC | Age: 48
End: 2018-08-22

## 2018-08-22 DIAGNOSIS — R79.89 LOW VITAMIN D LEVEL: Primary | ICD-10-CM

## 2018-08-22 RX ORDER — ERGOCALCIFEROL 1.25 MG/1
50000 CAPSULE ORAL WEEKLY
Qty: 12 CAPSULE | Refills: 0 | Status: SHIPPED | OUTPATIENT
Start: 2018-08-22 | End: 2018-12-05

## 2018-08-22 NOTE — TELEPHONE ENCOUNTER
----- Message from JOON Sharma CNP sent at 8/22/2018  1:20 PM EDT -----  Please let pt  Know her vitamin d level is low. I would like her to take a vitamin d supplement and then repeat her labs in 8 weeks to see if her vitamin d level has improved. Her cholesterol level is ok slightly over 200, incorporate physical activity in your daily routine. Her hgb and hct are OK but her iron saturation is slightly low at 16 normal is 20-50%. I would suggest she take a daily iron tablet.

## 2018-08-24 LAB — VITAMIN B6: 57.7 NMOL/L (ref 20–125)

## 2018-09-04 ENCOUNTER — OFFICE VISIT (OUTPATIENT)
Dept: FAMILY MEDICINE CLINIC | Age: 48
End: 2018-09-04
Payer: COMMERCIAL

## 2018-09-04 VITALS
DIASTOLIC BLOOD PRESSURE: 70 MMHG | SYSTOLIC BLOOD PRESSURE: 122 MMHG | HEART RATE: 66 BPM | RESPIRATION RATE: 14 BRPM | HEIGHT: 67 IN | TEMPERATURE: 98.2 F | BODY MASS INDEX: 21.12 KG/M2 | WEIGHT: 134.6 LBS

## 2018-09-04 DIAGNOSIS — Z71.2 ENCOUNTER TO DISCUSS TEST RESULTS: Primary | ICD-10-CM

## 2018-09-04 DIAGNOSIS — H57.89 EYE REDNESS: ICD-10-CM

## 2018-09-04 DIAGNOSIS — E55.9 HYPOVITAMINOSIS D: ICD-10-CM

## 2018-09-04 PROCEDURE — 99213 OFFICE O/P EST LOW 20 MIN: CPT | Performed by: NURSE PRACTITIONER

## 2018-09-04 ASSESSMENT — ENCOUNTER SYMPTOMS
EYE REDNESS: 1
PHOTOPHOBIA: 0
EYE DISCHARGE: 0
EYE ITCHING: 0
RESPIRATORY NEGATIVE: 1
EYE PAIN: 0

## 2018-09-04 NOTE — PATIENT INSTRUCTIONS
You may receive a survey about your visit with us today. The feedback from our patients helps us identify what is working well and where the service to all patients can be enhanced. Thank you! Contact Dr. Akilah Romero for eye appt.  CHRISTEL [phone]  Hoad f/u with SKYLER

## 2018-09-04 NOTE — PROGRESS NOTES
[address]  [address] [address]  Dept: [phone]  Dept Fax: [phone]  Loc: [phone]    Adalberto Katz is a 49 y.o. female who presents today for her medical conditions/complaints as noted below. Adalberto Katz is c/o of Follow-up (Pt presents for a 2 week f/u to discuss test results. ) and Eye Problem (Pt's eye problem that she was seen for a few weeks ago has not improved. She has been using the eye drops that were recommended with no change. )      HPI:     Pt here to discuss her test results. We went over her labs and she is taking the high dose vitamin d tablets. We did reviewed her cbc and iron saturation and lipid panel and hgb and hct. Pt states she no loner has periods, she also had an EGD last year that was normal. She has never had a colonoscopy but denies blood in her stool or black tarry stools. She states since starting the vitamin d and iron she has felt like she has had more energy. She dies have an upcoming GI appt. Will fax these labs. Will monitor and repeat labs in 2-3 months. Her left eye continues to be red and irritated in the left inner canthus area. Denies any drainage or edema. Will have her contact optometrist.         Current Outpatient Prescriptions   Medication Sig Dispense Refill    IRON PO Take by mouth      vitamin D (ERGOCALCIFEROL) 12423 units CAPS capsule Take 1 capsule by mouth once a week 12 capsule 0    acetaminophen (APAP EXTRA STRENGTH) 500 MG tablet Take 1,000 mg by mouth as needed for Pain      lisinopril (PRINIVIL;ZESTRIL) 10 MG tablet Take 1 tablet by mouth daily 30 tablet 6    meclizine (ANTIVERT) 12.5 MG tablet Take 1 tablet by mouth 3 times daily as needed (vertigo) 60 tablet 0     No current facility-administered medications for this visit.         Past Medical History:   Diagnosis Date    Acid reflux     Gouty arthritis     Hypertension       Past Surgical worsen or fail to improve. Reccommended tobacco cessation options including pharmacologic methods, counseled great than 3 minutes during this visit:  Yes  []  No  []  Patient Instructions   You may receive a survey about your visit with us today. The feedback from our patients helps us identify what is working well and where the service to all patients can be enhanced. Thank you! Contact Dr. Virginia Cedeno for eye appt. A [phone]  Coniue f/u with GI       Patient given educational materials - see patient instructions. Discussed use, benefit, and side effects of prescribed medications. All patient questions answered. Pt voiced understanding. Reviewed health maintenance. Instructed to continue current medications, diet and exercise. Patient agreed with treatment plan. Follow up as directed.        Electronically signed by JOON Ernst CNP on 9/4/2018 at 1:34 PM

## 2018-11-06 ENCOUNTER — NURSE ONLY (OUTPATIENT)
Dept: LAB | Age: 48
End: 2018-11-06

## 2018-11-06 DIAGNOSIS — R79.89 LOW VITAMIN D LEVEL: ICD-10-CM

## 2018-11-06 LAB
ERYTHROCYTE [DISTWIDTH] IN BLOOD BY AUTOMATED COUNT: 14.6 % (ref 11.5–14.5)
ERYTHROCYTE [DISTWIDTH] IN BLOOD BY AUTOMATED COUNT: 46.5 FL (ref 35–45)
HCT VFR BLD CALC: 36.9 % (ref 37–47)
HEMOGLOBIN: 11.7 GM/DL (ref 12–16)
MCH RBC QN AUTO: 27.5 PG (ref 26–33)
MCHC RBC AUTO-ENTMCNC: 31.7 GM/DL (ref 32.2–35.5)
MCV RBC AUTO: 86.8 FL (ref 81–99)
PLATELET # BLD: 264 THOU/MM3 (ref 130–400)
PMV BLD AUTO: 9.1 FL (ref 9.4–12.4)
RBC # BLD: 4.25 MILL/MM3 (ref 4.2–5.4)
VITAMIN D 25-HYDROXY: 24 NG/ML (ref 30–100)
WBC # BLD: 4.8 THOU/MM3 (ref 4.8–10.8)

## 2018-11-07 ENCOUNTER — TELEPHONE (OUTPATIENT)
Dept: FAMILY MEDICINE CLINIC | Age: 48
End: 2018-11-07

## 2018-11-07 DIAGNOSIS — E61.1 LOW IRON: Primary | ICD-10-CM

## 2018-11-07 DIAGNOSIS — E55.9 HYPOVITAMINOSIS D: ICD-10-CM

## 2018-11-07 LAB
IRON SATURATION: 13 % (ref 20–50)
IRON: 48 UG/DL (ref 50–170)
TOTAL IRON BINDING CAPACITY: 374 UG/DL (ref 171–450)

## 2018-11-07 RX ORDER — CHOLECALCIFEROL (VITAMIN D3) 50 MCG
2000 TABLET ORAL DAILY
Qty: 30 TABLET | Refills: 6 | Status: SHIPPED | OUTPATIENT
Start: 2018-11-07 | End: 2019-08-01 | Stop reason: SDUPTHER

## 2018-11-07 RX ORDER — LANOLIN ALCOHOL/MO/W.PET/CERES
325 CREAM (GRAM) TOPICAL 2 TIMES DAILY
Qty: 60 TABLET | Refills: 5 | Status: SHIPPED | OUTPATIENT
Start: 2018-11-07 | End: 2020-02-05 | Stop reason: SDUPTHER

## 2018-11-07 NOTE — TELEPHONE ENCOUNTER
Pt notified , states she  Went to [address] [place] [address] Way in [place]. pt said since there was no sx that her insurance wont pay for colonoscopy until 47 ,so she will get colonoscopy next year.

## 2018-11-20 ENCOUNTER — OFFICE VISIT (OUTPATIENT)
Dept: FAMILY MEDICINE CLINIC | Age: 48
End: 2018-11-20
Payer: COMMERCIAL

## 2018-11-20 VITALS
HEART RATE: 88 BPM | RESPIRATION RATE: 14 BRPM | WEIGHT: 134 LBS | BODY MASS INDEX: 21.53 KG/M2 | TEMPERATURE: 98.2 F | SYSTOLIC BLOOD PRESSURE: 126 MMHG | HEIGHT: 66 IN | DIASTOLIC BLOOD PRESSURE: 64 MMHG

## 2018-11-20 DIAGNOSIS — E61.1 LOW IRON: ICD-10-CM

## 2018-11-20 DIAGNOSIS — D50.9 IRON DEFICIENCY ANEMIA, UNSPECIFIED IRON DEFICIENCY ANEMIA TYPE: ICD-10-CM

## 2018-11-20 DIAGNOSIS — I10 ESSENTIAL HYPERTENSION: Primary | ICD-10-CM

## 2018-11-20 DIAGNOSIS — K11.7 XEROSTOMIA: ICD-10-CM

## 2018-11-20 DIAGNOSIS — R11.0 NAUSEA: ICD-10-CM

## 2018-11-20 DIAGNOSIS — R61 EXCESSIVE SWEATING: ICD-10-CM

## 2018-11-20 DIAGNOSIS — E55.9 VITAMIN D DEFICIENCY: ICD-10-CM

## 2018-11-20 PROCEDURE — 99215 OFFICE O/P EST HI 40 MIN: CPT | Performed by: NURSE PRACTITIONER

## 2018-11-20 RX ORDER — ONDANSETRON 4 MG/1
4 TABLET, FILM COATED ORAL DAILY PRN
Qty: 30 TABLET | Refills: 0 | Status: SHIPPED | OUTPATIENT
Start: 2018-11-20 | End: 2019-08-01 | Stop reason: SDUPTHER

## 2018-11-20 RX ORDER — OMEPRAZOLE 20 MG/1
20 CAPSULE, DELAYED RELEASE ORAL DAILY
Qty: 30 CAPSULE | Refills: 3 | Status: SHIPPED | OUTPATIENT
Start: 2018-11-20 | End: 2019-08-01 | Stop reason: ALTCHOICE

## 2018-11-20 RX ORDER — PILOCARPINE HYDROCHLORIDE 5 MG/1
5 TABLET, FILM COATED ORAL 3 TIMES DAILY
Qty: 90 TABLET | Refills: 0 | Status: SHIPPED | OUTPATIENT
Start: 2018-11-20 | End: 2019-01-29 | Stop reason: SINTOL

## 2018-11-20 RX ORDER — LISINOPRIL 10 MG/1
10 TABLET ORAL DAILY
Qty: 30 TABLET | Refills: 6 | Status: SHIPPED | OUTPATIENT
Start: 2018-11-20 | End: 2019-07-10 | Stop reason: SDUPTHER

## 2018-11-20 ASSESSMENT — ENCOUNTER SYMPTOMS
GASTROINTESTINAL NEGATIVE: 1
RESPIRATORY NEGATIVE: 1
BACK PAIN: 0

## 2018-11-20 ASSESSMENT — PATIENT HEALTH QUESTIONNAIRE - PHQ9
SUM OF ALL RESPONSES TO PHQ QUESTIONS 1-9: 0
SUM OF ALL RESPONSES TO PHQ QUESTIONS 1-9: 0
1. LITTLE INTEREST OR PLEASURE IN DOING THINGS: 0
2. FEELING DOWN, DEPRESSED OR HOPELESS: 0
SUM OF ALL RESPONSES TO PHQ9 QUESTIONS 1 & 2: 0

## 2018-11-20 NOTE — PROGRESS NOTES
[address]  [address] [address]  Dept: [phone]  Dept Fax: [phone]  Loc: [phone]    Lenard Vang is a 51 y.o. femalewho presents today for her medical conditions/complaints as noted below. Jessika Vega c/o of Follow-up; Other (dry mouth  noticed over  last  year ); and Other (exssisve  sweating  under arms  over last 2 years )      HPI:     Pt here for a 6 month check up. Pt complains of a dry mouth. she has had this for several months, she flosses everyday, has tried different mouth washes and drinks a lot of water, goes to dentist every 6 months for cleanings. Sucks on hard candies. She has acid reflux and has not been taking and gerd meds. Will restart prilsoec for her symptoms. Will have an acid taste in her mouth. She continues to take her bp meds. Denies chest pain. She has also started sweating excessively under her underarms. She has changed deodorants and antiperspirants and nothing has helped. She has a history of low iron and vitamin d deficiency. She is taking meds for this. Will recheck these levels in 2 months at her next visit. She also has nausea off and on and takes zofran for this. Current Outpatient Prescriptions   Medication Sig Dispense Refill    lisinopril (PRINIVIL;ZESTRIL) 10 MG tablet Take 1 tablet by mouth daily 30 tablet 6    ondansetron (ZOFRAN) 4 MG tablet Take 1 tablet by mouth daily as needed for Nausea or Vomiting 30 tablet 0    omeprazole (PRILOSEC) 20 MG delayed release capsule Take 1 capsule by mouth daily 30 capsule 3    pilocarpine (SALAGEN) 5 MG tablet Take 1 tablet by mouth 3 times daily 90 tablet 0    Sodium Fluoride-Xylitol 0.25 (F)-236.79 MG CHEW Take 1 tablet by mouth 2 times daily 60 tablet 2    aluminum chloride (DRYSOL) 20 % external solution Apply topically nightly.  60 mL 1    ferrous sulfate (FE TABS) 325 (65 Fe) MG EC tablet Take than 3 minutesduring this visit:  Yes[]  No  []       Patient given educational materials -see patient instructions. Discussed use, benefit, and side effects of prescribedmedications. All patient questions answered. Pt voiced understanding. Reviewedhealth maintenance. Instructed to continue current medications, diet and exercise. Patient agreed with treatment plan. Follow up as directed.        Electronicallysigned by JOON Lemon CNP on 11/20/2018 at 5:09 PM

## 2018-11-21 ENCOUNTER — TELEPHONE (OUTPATIENT)
Dept: FAMILY MEDICINE CLINIC | Age: 48
End: 2018-11-21

## 2018-12-05 ENCOUNTER — HOSPITAL ENCOUNTER (EMERGENCY)
Age: 48
Discharge: HOME | End: 2018-12-05
Payer: COMMERCIAL

## 2018-12-05 VITALS
OXYGEN SATURATION: 99 % | DIASTOLIC BLOOD PRESSURE: 73 MMHG | HEIGHT: 67 IN | RESPIRATION RATE: 16 BRPM | TEMPERATURE: 98.2 F | HEART RATE: 68 BPM | SYSTOLIC BLOOD PRESSURE: 127 MMHG | WEIGHT: 135 LBS | BODY MASS INDEX: 21.19 KG/M2

## 2018-12-05 DIAGNOSIS — S29.019A THORACIC MYOFASCIAL STRAIN, INITIAL ENCOUNTER: Primary | ICD-10-CM

## 2018-12-05 PROCEDURE — 99213 OFFICE O/P EST LOW 20 MIN: CPT | Performed by: NURSE PRACTITIONER

## 2018-12-05 PROCEDURE — 99212 OFFICE O/P EST SF 10 MIN: CPT

## 2018-12-05 PROCEDURE — 96372 THER/PROPH/DIAG INJ SC/IM: CPT

## 2018-12-05 PROCEDURE — 2709999900 HC NON-CHARGEABLE SUPPLY

## 2018-12-05 PROCEDURE — 6360000002 HC RX W HCPCS: Performed by: NURSE PRACTITIONER

## 2018-12-05 RX ORDER — TIZANIDINE 4 MG/1
4 TABLET ORAL EVERY 8 HOURS PRN
Qty: 15 TABLET | Refills: 0 | Status: SHIPPED | OUTPATIENT
Start: 2018-12-05 | End: 2018-12-10

## 2018-12-05 RX ORDER — KETOROLAC TROMETHAMINE 30 MG/ML
60 INJECTION, SOLUTION INTRAMUSCULAR; INTRAVENOUS ONCE
Status: COMPLETED | OUTPATIENT
Start: 2018-12-05 | End: 2018-12-05

## 2018-12-05 RX ADMIN — KETOROLAC TROMETHAMINE 60 MG: 30 INJECTION, SOLUTION INTRAMUSCULAR at 15:10

## 2018-12-05 ASSESSMENT — ENCOUNTER SYMPTOMS
COLOR CHANGE: 0
BACK PAIN: 1
ABDOMINAL SWELLING: 0
COUGH: 0
SHORTNESS OF BREATH: 0
BOWEL INCONTINENCE: 0
CHOKING: 0
APNEA: 0
DIARRHEA: 0
CHEST TIGHTNESS: 0
VOMITING: 0
NAUSEA: 0
STRIDOR: 0
ABDOMINAL PAIN: 0
WHEEZING: 0

## 2018-12-05 ASSESSMENT — PAIN DESCRIPTION - ONSET: ONSET: GRADUAL

## 2018-12-05 ASSESSMENT — PAIN SCALES - GENERAL
PAINLEVEL_OUTOF10: 8
PAINLEVEL_OUTOF10: 8

## 2018-12-05 ASSESSMENT — PAIN DESCRIPTION - DESCRIPTORS: DESCRIPTORS: ACHING;SORE

## 2018-12-05 ASSESSMENT — PAIN DESCRIPTION - PAIN TYPE: TYPE: ACUTE PAIN

## 2018-12-05 ASSESSMENT — PAIN DESCRIPTION - PROGRESSION: CLINICAL_PROGRESSION: NOT CHANGED

## 2018-12-05 ASSESSMENT — PAIN DESCRIPTION - FREQUENCY: FREQUENCY: CONTINUOUS

## 2018-12-05 ASSESSMENT — PAIN DESCRIPTION - LOCATION: LOCATION: BACK

## 2018-12-05 ASSESSMENT — PAIN DESCRIPTION - ORIENTATION: ORIENTATION: MID

## 2018-12-05 NOTE — ED TRIAGE NOTES
Pt to [facility] ambulatory with mid back pain. Pain started on Saturday. Pt was picking up her grandchildren from the couch when the pain started. Pt has applied heat to mid-back area with no relief.

## 2019-01-29 ENCOUNTER — OFFICE VISIT (OUTPATIENT)
Dept: FAMILY MEDICINE CLINIC | Age: 49
End: 2019-01-29
Payer: COMMERCIAL

## 2019-01-29 VITALS
SYSTOLIC BLOOD PRESSURE: 122 MMHG | WEIGHT: 133 LBS | DIASTOLIC BLOOD PRESSURE: 76 MMHG | BODY MASS INDEX: 20.88 KG/M2 | HEART RATE: 78 BPM | TEMPERATURE: 98.4 F | RESPIRATION RATE: 12 BRPM | OXYGEN SATURATION: 98 % | HEIGHT: 67 IN

## 2019-01-29 DIAGNOSIS — I10 ESSENTIAL HYPERTENSION: Primary | ICD-10-CM

## 2019-01-29 DIAGNOSIS — R79.89 LOW VITAMIN D LEVEL: ICD-10-CM

## 2019-01-29 DIAGNOSIS — D50.9 IRON DEFICIENCY ANEMIA, UNSPECIFIED IRON DEFICIENCY ANEMIA TYPE: ICD-10-CM

## 2019-01-29 DIAGNOSIS — R61 EXCESSIVE SWEATING: ICD-10-CM

## 2019-01-29 PROCEDURE — 99213 OFFICE O/P EST LOW 20 MIN: CPT | Performed by: NURSE PRACTITIONER

## 2019-01-29 ASSESSMENT — ENCOUNTER SYMPTOMS: RESPIRATORY NEGATIVE: 1

## 2019-02-07 ENCOUNTER — NURSE ONLY (OUTPATIENT)
Dept: LAB | Age: 49
End: 2019-02-07

## 2019-02-07 DIAGNOSIS — R79.89 LOW VITAMIN D LEVEL: ICD-10-CM

## 2019-02-07 DIAGNOSIS — D50.9 IRON DEFICIENCY ANEMIA, UNSPECIFIED IRON DEFICIENCY ANEMIA TYPE: ICD-10-CM

## 2019-02-07 LAB
BASOPHILS # BLD: 0.8 %
BASOPHILS ABSOLUTE: 0 THOU/MM3 (ref 0–0.1)
EOSINOPHIL # BLD: 2.5 %
EOSINOPHILS ABSOLUTE: 0.1 THOU/MM3 (ref 0–0.4)
ERYTHROCYTE [DISTWIDTH] IN BLOOD BY AUTOMATED COUNT: 14 % (ref 11.5–14.5)
ERYTHROCYTE [DISTWIDTH] IN BLOOD BY AUTOMATED COUNT: 44.6 FL (ref 35–45)
FERRITIN: 199 NG/ML (ref 10–291)
HCT VFR BLD CALC: 38.3 % (ref 37–47)
HEMOGLOBIN: 12.3 GM/DL (ref 12–16)
IMMATURE GRANS (ABS): 0.01 THOU/MM3 (ref 0–0.07)
IMMATURE GRANULOCYTES: 0.2 %
IRON: 74 UG/DL (ref 50–170)
LYMPHOCYTES # BLD: 47.8 %
LYMPHOCYTES ABSOLUTE: 2.3 THOU/MM3 (ref 1–4.8)
MCH RBC QN AUTO: 27.9 PG (ref 26–33)
MCHC RBC AUTO-ENTMCNC: 32.1 GM/DL (ref 32.2–35.5)
MCV RBC AUTO: 86.8 FL (ref 81–99)
MONOCYTES # BLD: 7.8 %
MONOCYTES ABSOLUTE: 0.4 THOU/MM3 (ref 0.4–1.3)
NUCLEATED RED BLOOD CELLS: 0 /100 WBC
PLATELET # BLD: 259 THOU/MM3 (ref 130–400)
PMV BLD AUTO: 9.8 FL (ref 9.4–12.4)
RBC # BLD: 4.41 MILL/MM3 (ref 4.2–5.4)
SEG NEUTROPHILS: 40.9 %
SEGMENTED NEUTROPHILS ABSOLUTE COUNT: 2 THOU/MM3 (ref 1.8–7.7)
VITAMIN D 25-HYDROXY: 22 NG/ML (ref 30–100)
WBC # BLD: 4.8 THOU/MM3 (ref 4.8–10.8)

## 2019-02-11 ENCOUNTER — TELEPHONE (OUTPATIENT)
Dept: FAMILY MEDICINE CLINIC | Age: 49
End: 2019-02-11

## 2019-06-13 ENCOUNTER — OFFICE VISIT (OUTPATIENT)
Dept: FAMILY MEDICINE CLINIC | Age: 49
End: 2019-06-13
Payer: COMMERCIAL

## 2019-06-13 VITALS
TEMPERATURE: 98.2 F | RESPIRATION RATE: 10 BRPM | WEIGHT: 132 LBS | SYSTOLIC BLOOD PRESSURE: 136 MMHG | HEART RATE: 68 BPM | HEIGHT: 65 IN | BODY MASS INDEX: 21.99 KG/M2 | DIASTOLIC BLOOD PRESSURE: 82 MMHG

## 2019-06-13 DIAGNOSIS — N89.8 VAGINAL DISCHARGE: Primary | ICD-10-CM

## 2019-06-13 DIAGNOSIS — Z20.2 POSSIBLE EXPOSURE TO STD: ICD-10-CM

## 2019-06-13 DIAGNOSIS — N89.8 VAGINAL ITCHING: ICD-10-CM

## 2019-06-13 LAB
CHLAMYDIA TRACHOMATIS BY RT-PCR: NOT DETECTED
CT/NG SOURCE: NORMAL
NEISSERIA GONORRHOEAE BY RT-PCR: NOT DETECTED
TRICHOMONAS VAGINALIS SCREEN: NEGATIVE

## 2019-06-13 PROCEDURE — 99213 OFFICE O/P EST LOW 20 MIN: CPT | Performed by: NURSE PRACTITIONER

## 2019-06-13 PROCEDURE — 87808 TRICHOMONAS ASSAY W/OPTIC: CPT | Performed by: NURSE PRACTITIONER

## 2019-06-13 RX ORDER — FLUCONAZOLE 150 MG/1
150 TABLET ORAL ONCE
Qty: 2 TABLET | Refills: 0 | Status: SHIPPED | OUTPATIENT
Start: 2019-06-13 | End: 2019-07-10 | Stop reason: SDUPTHER

## 2019-06-13 ASSESSMENT — PATIENT HEALTH QUESTIONNAIRE - PHQ9
SUM OF ALL RESPONSES TO PHQ9 QUESTIONS 1 & 2: 0
SUM OF ALL RESPONSES TO PHQ QUESTIONS 1-9: 0
SUM OF ALL RESPONSES TO PHQ QUESTIONS 1-9: 0
1. LITTLE INTEREST OR PLEASURE IN DOING THINGS: 0
2. FEELING DOWN, DEPRESSED OR HOPELESS: 0

## 2019-06-13 NOTE — PROGRESS NOTES
Obed Wylie is a 51 y.o. female for   Chief Complaint   Patient presents with    Vaginal Itching     vaginial irritation, white discharge, with some itching for past week        Complains of white and thick vaginal discharge for 1 week(s). Currently sexually active? Yes - she did have unprotected intercourse with a new partner  Current number of sexual partners? 1  Recent STD exposure? possible  History of sexually transmitted disease? No Treated? no  LMP? No LMP recorded. Patient has had a hysterectomy. General ROS: positive for  - vaginal discharge and irritation  Genito-Urinary ROS: no dysuria, trouble voiding, or hematuria      OBJECTIVE:  /82   Pulse 68   Temp 98.2 °F (36.8 °C) (Oral)   Resp 10   Ht 5' 5\" (1.651 m)   Wt 132 lb (59.9 kg)   BMI 21.97 kg/m²   General appearance: alert, well appearing, and in no distress and well hydrated. CVS exam: normal rate, regular rhythm, normal S1, S2, no murmurs, rubs, clicks or gallops. Chest: clear to auscultation, no wheezes, rales or rhonchi, symmetric air entry. Abdominal exam: soft, nontender, nondistended, no masses or organomegaly.  Female Exam:  Vulva: normal appearing vulva with no masses, tenderness or lesions Vagina: vaginal with thick white discharge Cervix: cervical motion tenderness absent and erythematous        ASSESSMENT & PLAN  Tio Pickett was seen today for vaginal itching. Diagnoses and all orders for this visit:    Vaginal discharge    Vaginal itching  Orders Placed This Encounter   Procedures    C. Trachomatis / N. Gonorrhoeae, DNA Probe    POCT Trichomonas Rapid Test   negative trichomonas      No follow-ups on file. Follow-up: Will call patient with results of testing and treat if necessary. RTC PRN.

## 2019-06-16 LAB
GENITAL CULTURE, ROUTINE: NORMAL
GRAM STAIN RESULT: NORMAL

## 2019-06-17 ENCOUNTER — TELEPHONE (OUTPATIENT)
Dept: FAMILY MEDICINE CLINIC | Age: 49
End: 2019-06-17

## 2019-06-17 NOTE — TELEPHONE ENCOUNTER
----- Message from JOON Faye CNP sent at 6/17/2019  5:18 PM EDT -----  Please let pt know her vaginal cultures did not identify any bacteria, ask her if her symptoms have improved.

## 2019-06-18 NOTE — TELEPHONE ENCOUNTER
Pt returned our call & states she is still having some discharge but she feels it's getting better. Pt did take the second dose of the fluconazole last night.

## 2019-06-24 ENCOUNTER — TELEPHONE (OUTPATIENT)
Dept: FAMILY MEDICINE CLINIC | Age: 49
End: 2019-06-24

## 2019-06-24 RX ORDER — METRONIDAZOLE 500 MG/1
500 TABLET ORAL 2 TIMES DAILY
Qty: 14 TABLET | Refills: 0 | Status: SHIPPED | OUTPATIENT
Start: 2019-06-24 | End: 2019-07-01

## 2019-06-24 NOTE — TELEPHONE ENCOUNTER
Pt called stating she has finished all the medication for her yeast inf but states she's still having quite a bit of discharge. Pt is wondering if she needs another script or if Justin could suggest something OTC? Pt uses [facility] on [address].    Please advise

## 2019-07-10 DIAGNOSIS — I10 ESSENTIAL HYPERTENSION: ICD-10-CM

## 2019-07-10 DIAGNOSIS — N89.8 VAGINAL DISCHARGE: ICD-10-CM

## 2019-07-10 RX ORDER — FLUCONAZOLE 150 MG/1
150 TABLET ORAL ONCE
Qty: 2 TABLET | Refills: 0 | Status: SHIPPED | OUTPATIENT
Start: 2019-07-10 | End: 2019-07-10

## 2019-07-10 RX ORDER — LISINOPRIL 10 MG/1
10 TABLET ORAL DAILY
Qty: 30 TABLET | Refills: 6 | Status: SHIPPED | OUTPATIENT
Start: 2019-07-10 | End: 2020-02-05 | Stop reason: SDUPTHER

## 2019-08-01 ENCOUNTER — NURSE ONLY (OUTPATIENT)
Dept: LAB | Age: 49
End: 2019-08-01

## 2019-08-01 ENCOUNTER — OFFICE VISIT (OUTPATIENT)
Dept: FAMILY MEDICINE CLINIC | Age: 49
End: 2019-08-01
Payer: COMMERCIAL

## 2019-08-01 VITALS
WEIGHT: 132 LBS | HEIGHT: 67 IN | BODY MASS INDEX: 20.72 KG/M2 | SYSTOLIC BLOOD PRESSURE: 123 MMHG | RESPIRATION RATE: 12 BRPM | TEMPERATURE: 98 F | DIASTOLIC BLOOD PRESSURE: 78 MMHG | HEART RATE: 82 BPM

## 2019-08-01 DIAGNOSIS — R11.0 NAUSEA: ICD-10-CM

## 2019-08-01 DIAGNOSIS — E55.9 HYPOVITAMINOSIS D: ICD-10-CM

## 2019-08-01 LAB — VITAMIN D 25-HYDROXY: 34 NG/ML (ref 30–100)

## 2019-08-01 PROCEDURE — 99213 OFFICE O/P EST LOW 20 MIN: CPT | Performed by: NURSE PRACTITIONER

## 2019-08-01 RX ORDER — CHOLECALCIFEROL (VITAMIN D3) 50 MCG
2000 TABLET ORAL DAILY
Qty: 90 TABLET | Refills: 3 | Status: SHIPPED | OUTPATIENT
Start: 2019-08-01

## 2019-08-01 RX ORDER — ONDANSETRON 4 MG/1
4 TABLET, FILM COATED ORAL DAILY PRN
Qty: 30 TABLET | Refills: 0 | Status: SHIPPED | OUTPATIENT
Start: 2019-08-01 | End: 2020-08-06 | Stop reason: SDUPTHER

## 2019-08-01 ASSESSMENT — ENCOUNTER SYMPTOMS
RHINORRHEA: 0
SINUS PRESSURE: 0
RESPIRATORY NEGATIVE: 1
SINUS PAIN: 0
GASTROINTESTINAL NEGATIVE: 1
BACK PAIN: 0

## 2019-08-01 NOTE — PROGRESS NOTES
[place] [address] [place] [address]  Dept: [phone]  Dept Fax: [phone]  Loc: [phone]    Aj Chirinos is a 51 y.o. femalewho presents today for her medical conditions/complaints as noted below. Oanh Vega c/o of Hypertension (6 month f/u) and Vaginal Discharge (no other symptoms, no odor, white milky discharge)      HPI:      HTN    Does patient check BP regularly at home? - Yes  Current Medication regimen - lisinopril 10 mg daily  Tolerating medications well? - yes    Shortness of breath or chest pain? No  Headache or visual complaints? No  Neurologic changes like confusion? No  Extremity edema? No    BP Readings from Last 3 Encounters:  08/01/19 : 123/78  06/13/19 : 136/82  01/29/19 : 122/76    States she does still have the vaginal discharge but all cultures have been negative. She has had diflucan and flagyl. No itching or burning. She continues to take the vitamin d at 2000 units daily, will recheck levels. She does still take the iron. Current Outpatient Medications   Medication Sig Dispense Refill    ondansetron (ZOFRAN) 4 MG tablet Take 1 tablet by mouth daily as needed for Nausea or Vomiting 30 tablet 0    Cholecalciferol (VITAMIN D) 2000 units TABS tablet Take 1 tablet by mouth daily 90 tablet 3    lisinopril (PRINIVIL;ZESTRIL) 10 MG tablet Take 1 tablet by mouth daily 30 tablet 6    ferrous sulfate (FE TABS) 325 (65 Fe) MG EC tablet Take 1 tablet by mouth 2 times daily 60 tablet 5    meclizine (ANTIVERT) 12.5 MG tablet Take 1 tablet by mouth 3 times daily as needed (vertigo) 60 tablet 0     No current facility-administered medications for this visit.            Past Medical History:   Diagnosis Date    Acid reflux     Gouty arthritis     Hypertension       Past Surgical History:   Procedure Laterality Date    FINGER TRIGGER RELEASE      INNER EAR SURGERY  2015    PARTIAL HYSTERECTOMY

## 2019-08-02 ENCOUNTER — TELEPHONE (OUTPATIENT)
Dept: FAMILY MEDICINE CLINIC | Age: 49
End: 2019-08-02

## 2019-10-14 ENCOUNTER — TELEPHONE (OUTPATIENT)
Dept: FAMILY MEDICINE CLINIC | Age: 49
End: 2019-10-14

## 2020-01-23 ENCOUNTER — HOSPITAL ENCOUNTER (EMERGENCY)
Age: 50
Discharge: HOME | End: 2020-01-23
Payer: COMMERCIAL

## 2020-01-23 VITALS
HEART RATE: 72 BPM | RESPIRATION RATE: 16 BRPM | SYSTOLIC BLOOD PRESSURE: 139 MMHG | DIASTOLIC BLOOD PRESSURE: 82 MMHG | HEIGHT: 67 IN | WEIGHT: 130 LBS | TEMPERATURE: 98.7 F | OXYGEN SATURATION: 99 % | BODY MASS INDEX: 20.4 KG/M2

## 2020-01-23 PROCEDURE — 99213 OFFICE O/P EST LOW 20 MIN: CPT

## 2020-01-23 PROCEDURE — 99213 OFFICE O/P EST LOW 20 MIN: CPT | Performed by: NURSE PRACTITIONER

## 2020-01-23 RX ORDER — AMOXICILLIN AND CLAVULANATE POTASSIUM 500; 125 MG/1; MG/1
1 TABLET, FILM COATED ORAL 3 TIMES DAILY
Qty: 21 TABLET | Refills: 0 | Status: SHIPPED | OUTPATIENT
Start: 2020-01-23 | End: 2020-01-30

## 2020-01-23 RX ORDER — FLUTICASONE PROPIONATE 50 MCG
2 SPRAY, SUSPENSION (ML) NASAL DAILY
Qty: 1 BOTTLE | Refills: 0 | Status: SHIPPED | OUTPATIENT
Start: 2020-01-23 | End: 2020-08-06 | Stop reason: ALTCHOICE

## 2020-01-23 ASSESSMENT — ENCOUNTER SYMPTOMS
NAUSEA: 0
SORE THROAT: 1
ALLERGIC/IMMUNOLOGIC NEGATIVE: 1
RHINORRHEA: 0
CHEST TIGHTNESS: 0
FACIAL SWELLING: 0
SHORTNESS OF BREATH: 0
VOICE CHANGE: 0
WHEEZING: 0
COUGH: 1
VOMITING: 0
TROUBLE SWALLOWING: 0
SINUS PRESSURE: 0
STRIDOR: 0
ABDOMINAL PAIN: 0
EYE REDNESS: 0

## 2020-01-23 NOTE — ED PROVIDER NOTES
[address]       Chief Complaint   Patient presents with    Cough    Pharyngitis       Nurses Notes reviewed and I agree except as noted in the HPI. HISTORY OF PRESENT ILLNESS   Radha Doyle is a 47 y.o. female who presents to the urgent care for cough, sore throat for the past two weeks. She complains of cough, sore throat, intermittent nasal congestion and rhinorrhea. She denies any wheezing, fevers, body aches or chills. She does have history of hypertension and is currently taking lisinopril. She denies any visual disturbance, chest pain, dizziness or shortness of breath. She is requesting a work note for tomorrow. She is a non-smoker. REVIEW OF SYSTEMS     Review of Systems   Constitutional: Negative for activity change, appetite change, chills, diaphoresis, fatigue and fever. HENT: Positive for sore throat. Negative for congestion, ear pain, facial swelling, postnasal drip, rhinorrhea, sinus pressure, sneezing, trouble swallowing and voice change. Eyes: Negative for redness. Respiratory: Positive for cough. Negative for chest tightness, shortness of breath, wheezing and stridor. Cardiovascular: Negative for chest pain. Gastrointestinal: Negative for abdominal pain, nausea and vomiting. Genitourinary: Negative for decreased urine volume. Musculoskeletal: Negative for myalgias. Skin: Negative. Allergic/Immunologic: Negative. Neurological: Negative for dizziness and headaches. Hematological: Negative for adenopathy. Psychiatric/Behavioral: The patient is not nervous/anxious. PAST MEDICAL HISTORY         Diagnosis Date    Acid reflux     Gouty arthritis     Hypertension        SURGICAL HISTORY     Patient  has a past surgical history that includes Inner ear surgery (2015);  Finger trigger release; partial hysterectomy (cervix not removed); and Upper gastrointestinal endoscopy (10/06/2017). CURRENT MEDICATIONS       Previous Medications    CHOLECALCIFEROL (VITAMIN D) 2000 UNITS TABS TABLET    Take 1 tablet by mouth daily    FERROUS SULFATE (FE TABS) 325 (65 FE) MG EC TABLET    Take 1 tablet by mouth 2 times daily    LISINOPRIL (PRINIVIL;ZESTRIL) 10 MG TABLET    Take 1 tablet by mouth daily    MECLIZINE (ANTIVERT) 12.5 MG TABLET    Take 1 tablet by mouth 3 times daily as needed (vertigo)    ONDANSETRON (ZOFRAN) 4 MG TABLET    Take 1 tablet by mouth daily as needed for Nausea or Vomiting       ALLERGIES     Patient is has No Known Allergies. FAMILY HISTORY     Patient'sfamily history includes Cancer in her mother; Heart Disease in her father. SOCIAL HISTORY     Patient  reports that she has never smoked. She has never used smokeless tobacco. She reports that she does not drink alcohol or use drugs. PHYSICAL EXAM     ED TRIAGE VITALS  BP: (!) 182/95, Temp: 98.7 °F (37.1 °C), Pulse: 74, Resp: 16, SpO2: 100 %  Physical Exam  Vitals signs and nursing note reviewed. Constitutional:       General: She is not in acute distress. Appearance: Normal appearance. She is well-developed, well-groomed and normal weight. She is not ill-appearing, toxic-appearing or diaphoretic. HENT:      Head: Normocephalic and atraumatic. Right Ear: Hearing, tympanic membrane, ear canal and external ear normal.      Left Ear: Hearing, tympanic membrane, ear canal and external ear normal.      Nose: Nose normal.      Mouth/Throat:      Lips: Pink. Mouth: Mucous membranes are moist.      Pharynx: Oropharynx is clear. Uvula midline. No pharyngeal swelling, oropharyngeal exudate, posterior oropharyngeal erythema or uvula swelling. Tonsils: No tonsillar exudate or tonsillar abscesses. Swellin+ on the right. 2+ on the left. Eyes:      Conjunctiva/sclera: Conjunctivae normal.      Right eye: Right conjunctiva is not injected. Left eye: Left conjunctiva is not injected.       Pupils:

## 2020-02-05 RX ORDER — LISINOPRIL 10 MG/1
10 TABLET ORAL DAILY
Qty: 30 TABLET | Refills: 6 | Status: SHIPPED | OUTPATIENT
Start: 2020-02-05 | End: 2020-08-06 | Stop reason: SDUPTHER

## 2020-02-05 RX ORDER — LANOLIN ALCOHOL/MO/W.PET/CERES
325 CREAM (GRAM) TOPICAL 2 TIMES DAILY
Qty: 60 TABLET | Refills: 5 | Status: SHIPPED | OUTPATIENT
Start: 2020-02-05

## 2020-08-06 ENCOUNTER — OFFICE VISIT (OUTPATIENT)
Dept: FAMILY MEDICINE CLINIC | Age: 50
End: 2020-08-06
Payer: COMMERCIAL

## 2020-08-06 VITALS
SYSTOLIC BLOOD PRESSURE: 138 MMHG | HEART RATE: 72 BPM | WEIGHT: 139 LBS | DIASTOLIC BLOOD PRESSURE: 72 MMHG | RESPIRATION RATE: 12 BRPM | HEIGHT: 67 IN | TEMPERATURE: 98.2 F | BODY MASS INDEX: 21.82 KG/M2

## 2020-08-06 PROCEDURE — 99214 OFFICE O/P EST MOD 30 MIN: CPT | Performed by: NURSE PRACTITIONER

## 2020-08-06 RX ORDER — ONDANSETRON 4 MG/1
4 TABLET, FILM COATED ORAL DAILY PRN
Qty: 30 TABLET | Refills: 0 | Status: SHIPPED | OUTPATIENT
Start: 2020-08-06 | End: 2021-10-04 | Stop reason: SDUPTHER

## 2020-08-06 RX ORDER — LISINOPRIL 10 MG/1
10 TABLET ORAL DAILY
Qty: 90 TABLET | Refills: 3 | Status: SHIPPED | OUTPATIENT
Start: 2020-08-06 | End: 2021-10-04 | Stop reason: SDUPTHER

## 2020-08-06 ASSESSMENT — ENCOUNTER SYMPTOMS
ABDOMINAL DISTENTION: 0
ABDOMINAL PAIN: 0
NAUSEA: 1
RESPIRATORY NEGATIVE: 1

## 2020-08-06 ASSESSMENT — PATIENT HEALTH QUESTIONNAIRE - PHQ9
SUM OF ALL RESPONSES TO PHQ QUESTIONS 1-9: 0
1. LITTLE INTEREST OR PLEASURE IN DOING THINGS: 0
2. FEELING DOWN, DEPRESSED OR HOPELESS: 0
SUM OF ALL RESPONSES TO PHQ9 QUESTIONS 1 & 2: 0
SUM OF ALL RESPONSES TO PHQ QUESTIONS 1-9: 0

## 2020-08-06 NOTE — PROGRESS NOTES
[facility]  [address] [place] [facility] [place] [address]  Dept: [phone]  Dept Fax: [phone]  Loc: [phone]    Sky Sol is a 47 y.o. femalewho presents today for her medical conditions/complaints as noted below. Jacqueline Vega c/o of Annual Exam      HPI:      Pt her for yearly physical.     HTN    Does patient check BP regularly at home? - No  Current Medication regimen - lisinopril 10 mg daily  Tolerating medications well? - yes    Shortness of breath or chest pain? No  Headache or visual complaints? No  Neurologic changes like confusion? No  Extremity edema? No    BP Readings from Last 3 Encounters:  08/06/20 : 138/72  01/23/20 : 139/82  08/01/19 : 123/78    She contineus to take otc ferrous sulfate 65 mg bid for anemia Lab Results       Component                Value               Date                       WBC                      4.8                 02/07/2019                 HGB                      12.3                02/07/2019                 HCT                      38.3                02/07/2019                 MCV                      86.8                02/07/2019                 PLT                      259                 02/07/2019              Taking 2000 units of vitamin d daily for a low vitamin d level   Family history of ovarian cancer, due for yearly pap will schedule next month. Will take zofran on occasion for nausea.  Had a normal colonoscopy         Current Outpatient Medications   Medication Sig Dispense Refill    ondansetron (ZOFRAN) 4 MG tablet Take 1 tablet by mouth daily as needed for Nausea or Vomiting 30 tablet 0    lisinopril (PRINIVIL;ZESTRIL) 10 MG tablet Take 1 tablet by mouth daily 90 tablet 3    ferrous sulfate (FE TABS) 325 (65 Fe) MG EC tablet Take 1 tablet by mouth 2 times daily 60 tablet 5    Cholecalciferol (VITAMIN D) 2000 units TABS tablet Take 1 tablet by mouth daily 90 tablet 3    meclizine (ANTIVERT) 12.5 MG tablet Take 1 tablet by mouth 3 times daily as needed (vertigo) 60 tablet 0     No current facility-administered medications for this visit. Past Medical History:   Diagnosis Date    Acid reflux     Gouty arthritis     Hypertension       Past Surgical History:   Procedure Laterality Date    FINGER TRIGGER RELEASE      INNER EAR SURGERY  2015    PARTIAL HYSTERECTOMY      UPPER GASTROINTESTINAL ENDOSCOPY  10/06/2017     Family History   Problem Relation Age of Onset    Cancer Mother     Heart Disease Father      Social History     Tobacco Use    Smoking status: Never Smoker    Smokeless tobacco: Never Used   Substance Use Topics    Alcohol use: No        No Known Allergies    Health Maintenance   Topic Date Due    Potassium monitoring  08/21/2019    Creatinine monitoring  08/21/2019    Shingles Vaccine (1 of 2) 09/07/2019    Flu vaccine (1) 09/01/2020    Cervical cancer screen  09/25/2020    Breast cancer screen  10/21/2021    Lipid screen  08/21/2023    DTaP/Tdap/Td vaccine (2 - Td) 04/06/2024    Colon cancer screen colonoscopy  09/24/2029    HIV screen  Completed    Hepatitis A vaccine  Aged Out    Hepatitis B vaccine  Aged Out    Hib vaccine  Aged Out    Meningococcal (ACWY) vaccine  Aged Out    Pneumococcal 0-64 years Vaccine  Aged Out       Subjective:      Review of Systems   Constitutional: Negative for chills, fatigue and fever. HENT: Negative. Respiratory: Negative. Cardiovascular: Negative. Gastrointestinal: Positive for nausea. Negative for abdominal distention and abdominal pain. Genitourinary: Negative for difficulty urinating and dysuria. Musculoskeletal: Negative. Skin: Negative. Neurological: Negative for dizziness, weakness and headaches. Psychiatric/Behavioral: Negative for self-injury, sleep disturbance and suicidal ideas.        Objective:      /72   Pulse 72   Temp 98.2 °F (36.8 °C) (Oral)   Resp 12 Ht 5' 6.54\" (1.69 m)   Wt 139 lb (63 kg)   BMI 22.08 kg/m²      Physical Exam     Assessment/Plan:           1. Physical exam      2. Nausea    - ondansetron (ZOFRAN) 4 MG tablet; Take 1 tablet by mouth daily as needed for Nausea or Vomiting  Dispense: 30 tablet; Refill: 0    3. Essential hypertension  Stable  Continue current meds  - lisinopril (PRINIVIL;ZESTRIL) 10 MG tablet; Take 1 tablet by mouth daily  Dispense: 90 tablet; Refill: 3  - Basic Metabolic Panel; Future  - CBC With Auto Differential; Future    4. Hypovitaminosis D    - Vitamin D 25 Hydroxy; Future  - CBC With Auto Differential; Future    5. Screening cholesterol level  Low fat low cholesterol meds  - Lipid Panel; Future    6. History of anemia    - Iron; Future  - Vitamin B12 & Folate; Future  - Ferritin; Future      Return if symptoms worsen or fail to improve, for pap test.    Reccommended tobaccocessation options including pharmacologic methods, counseled great than 3 minutesduring this visit:  Yes[]  No  []       Patient given educational materials -see patient instructions. Discussed use, benefit, and side effects of prescribedmedications. All patient questions answered. Pt voiced understanding. Reviewedhealth maintenance. Instructed to continue current medications, diet and exercise. Patient agreed with treatment plan. Follow up as directed.        Electronicallysigned by JOON Delgado CNP on 8/6/2020 at 4:11 PM

## 2020-08-12 ENCOUNTER — TELEPHONE (OUTPATIENT)
Dept: FAMILY MEDICINE CLINIC | Age: 50
End: 2020-08-12

## 2020-08-12 ENCOUNTER — NURSE ONLY (OUTPATIENT)
Dept: LAB | Age: 50
End: 2020-08-12

## 2020-08-12 LAB
ACANTHOCYTES: ABNORMAL
ANION GAP SERPL CALCULATED.3IONS-SCNC: 13 MEQ/L (ref 8–16)
BASOPHILS # BLD: 1.5 %
BASOPHILS ABSOLUTE: 0 THOU/MM3 (ref 0–0.1)
BUN BLDV-MCNC: 8 MG/DL (ref 7–22)
CALCIUM SERPL-MCNC: 9.6 MG/DL (ref 8.5–10.5)
CHLORIDE BLD-SCNC: 104 MEQ/L (ref 98–111)
CHOLESTEROL, TOTAL: 198 MG/DL (ref 100–199)
CO2: 25 MEQ/L (ref 23–33)
CREAT SERPL-MCNC: 0.6 MG/DL (ref 0.4–1.2)
CRENATED RBC'S: ABNORMAL
EOSINOPHIL # BLD: 4.8 %
EOSINOPHILS ABSOLUTE: 0.1 THOU/MM3 (ref 0–0.4)
ERYTHROCYTE [DISTWIDTH] IN BLOOD BY AUTOMATED COUNT: 14.5 % (ref 11.5–14.5)
ERYTHROCYTE [DISTWIDTH] IN BLOOD BY AUTOMATED COUNT: 47.7 FL (ref 35–45)
FERRITIN: 259 NG/ML (ref 10–291)
FOLATE: 13.6 NG/ML (ref 4.8–24.2)
GFR SERPL CREATININE-BSD FRML MDRD: > 90 ML/MIN/1.73M2
GLUCOSE BLD-MCNC: 84 MG/DL (ref 70–108)
HCT VFR BLD CALC: 38.4 % (ref 37–47)
HDLC SERPL-MCNC: 98 MG/DL
HEMOGLOBIN: 12 GM/DL (ref 12–16)
IMMATURE GRANS (ABS): 0.01 THOU/MM3 (ref 0–0.07)
IMMATURE GRANULOCYTES: 0.4 %
IRON: 89 UG/DL (ref 50–170)
LDL CHOLESTEROL CALCULATED: 93 MG/DL
LYMPHOCYTES # BLD: 54.4 %
LYMPHOCYTES ABSOLUTE: 1.5 THOU/MM3 (ref 1–4.8)
MCH RBC QN AUTO: 28.1 PG (ref 26–33)
MCHC RBC AUTO-ENTMCNC: 31.3 GM/DL (ref 32.2–35.5)
MCV RBC AUTO: 89.9 FL (ref 81–99)
MONOCYTES # BLD: 8.1 %
MONOCYTES ABSOLUTE: 0.2 THOU/MM3 (ref 0.4–1.3)
NUCLEATED RED BLOOD CELLS: 0 /100 WBC
PLATELET # BLD: 230 THOU/MM3 (ref 130–400)
PLATELET ESTIMATE: ADEQUATE
PMV BLD AUTO: 9.2 FL (ref 9.4–12.4)
POIKILOCYTES: ABNORMAL
POTASSIUM SERPL-SCNC: 4.1 MEQ/L (ref 3.5–5.2)
RBC # BLD: 4.27 MILL/MM3 (ref 4.2–5.4)
SCAN OF BLOOD SMEAR: NORMAL
SEG NEUTROPHILS: 30.8 %
SEGMENTED NEUTROPHILS ABSOLUTE COUNT: 0.8 THOU/MM3 (ref 1.8–7.7)
SODIUM BLD-SCNC: 142 MEQ/L (ref 135–145)
TRIGL SERPL-MCNC: 35 MG/DL (ref 0–199)
VITAMIN B-12: 297 PG/ML (ref 211–911)
VITAMIN D 25-HYDROXY: 34 NG/ML (ref 30–100)
WBC # BLD: 2.7 THOU/MM3 (ref 4.8–10.8)

## 2020-08-26 ENCOUNTER — TELEPHONE (OUTPATIENT)
Dept: FAMILY MEDICINE CLINIC | Age: 50
End: 2020-08-26

## 2020-08-26 ENCOUNTER — NURSE ONLY (OUTPATIENT)
Dept: LAB | Age: 50
End: 2020-08-26

## 2020-08-26 LAB
BASOPHILS # BLD: 1.5 %
BASOPHILS ABSOLUTE: 0 THOU/MM3 (ref 0–0.1)
EOSINOPHIL # BLD: 4.5 %
EOSINOPHILS ABSOLUTE: 0.1 THOU/MM3 (ref 0–0.4)
ERYTHROCYTE [DISTWIDTH] IN BLOOD BY AUTOMATED COUNT: 14.1 % (ref 11.5–14.5)
ERYTHROCYTE [DISTWIDTH] IN BLOOD BY AUTOMATED COUNT: 46.5 FL (ref 35–45)
HCT VFR BLD CALC: 36.3 % (ref 37–47)
HEMOGLOBIN: 11.6 GM/DL (ref 12–16)
IMMATURE GRANS (ABS): 0.01 THOU/MM3 (ref 0–0.07)
IMMATURE GRANULOCYTES: 0.3 %
LYMPHOCYTES # BLD: 54.5 %
LYMPHOCYTES ABSOLUTE: 1.8 THOU/MM3 (ref 1–4.8)
MCH RBC QN AUTO: 28.7 PG (ref 26–33)
MCHC RBC AUTO-ENTMCNC: 32 GM/DL (ref 32.2–35.5)
MCV RBC AUTO: 89.9 FL (ref 81–99)
MONOCYTES # BLD: 8.4 %
MONOCYTES ABSOLUTE: 0.3 THOU/MM3 (ref 0.4–1.3)
NUCLEATED RED BLOOD CELLS: 0 /100 WBC
PLATELET # BLD: 245 THOU/MM3 (ref 130–400)
PMV BLD AUTO: 9.4 FL (ref 9.4–12.4)
RBC # BLD: 4.04 MILL/MM3 (ref 4.2–5.4)
SEG NEUTROPHILS: 30.8 %
SEGMENTED NEUTROPHILS ABSOLUTE COUNT: 1 THOU/MM3 (ref 1.8–7.7)
WBC # BLD: 3.3 THOU/MM3 (ref 4.8–10.8)

## 2020-08-26 NOTE — TELEPHONE ENCOUNTER
----- Message from JOON Tamayo CNP sent at 8/26/2020  2:02 PM EDT -----  Let pt know her white count increased to 3.3 from 2.7 so it is coming up, most likely low white count was due to a viral infection at time of labs. Continue current therapy, she should also be taking a multivitamin with iron. We will recheck her labs in 3 month again. I will place those orders please mail to her.

## 2020-09-29 ENCOUNTER — OFFICE VISIT (OUTPATIENT)
Dept: FAMILY MEDICINE CLINIC | Age: 50
End: 2020-09-29
Payer: COMMERCIAL

## 2020-09-29 ENCOUNTER — TELEPHONE (OUTPATIENT)
Dept: FAMILY MEDICINE CLINIC | Age: 50
End: 2020-09-29

## 2020-09-29 VITALS
RESPIRATION RATE: 14 BRPM | OXYGEN SATURATION: 98 % | HEIGHT: 65 IN | DIASTOLIC BLOOD PRESSURE: 80 MMHG | TEMPERATURE: 98.5 F | WEIGHT: 135 LBS | HEART RATE: 72 BPM | BODY MASS INDEX: 22.49 KG/M2 | SYSTOLIC BLOOD PRESSURE: 126 MMHG

## 2020-09-29 PROCEDURE — 90750 HZV VACC RECOMBINANT IM: CPT | Performed by: NURSE PRACTITIONER

## 2020-09-29 PROCEDURE — 99386 PREV VISIT NEW AGE 40-64: CPT | Performed by: NURSE PRACTITIONER

## 2020-09-29 PROCEDURE — 90471 IMMUNIZATION ADMIN: CPT | Performed by: NURSE PRACTITIONER

## 2020-09-29 NOTE — TELEPHONE ENCOUNTER
Pt scheduled for appt today 9/29/20 at 3:40 with Bev Andrews. Justin requested that I call pt to verify if this time still works for her. LMOM requesting pt to call back at earliest convenience.

## 2020-09-29 NOTE — PROGRESS NOTES
Immunization(s) given during visit:    Immunizations Administered     Name Date Dose Route    Zoster Recombinant (Shingrix) 9/29/2020 0.5 mL Intramuscular    Site: Deltoid- Left    Lot: [id number]    NDC: 13898-940-17          Most recent Vaccine Information Sheet dated 10.30.2019 given to pt

## 2020-09-29 NOTE — PROGRESS NOTES
Leroy Rodriguez is a 45 y.o. female for   Chief Complaint   Patient presents with    Gynecologic Exam       HPI:    No LMP recorded. Patient has had a hysterectomy. Menses occurs every menopausal    Patient Gynecological History No Yes Not Asked comment          History of Abnormal Pap [x]                     []                     []                  Last pap: 2017   Has received HPV vaccine [x]                     []                     []                     Currently sexually active [x]                     []                     []                  status post hysterectomy   History of Sexually Transmitted Disease [x]            []                     []                     History of Abnormal Mammograms [x]            []                     []                   Last mammo: 2019     Last Dexa Scan: NI  Last Colonoscopy: normal    Urinary Incontinence? No  Dysuria? No  Vaginal itching or Dryness? No  Vaginal discharge?   No    Social History     Socioeconomic History    Marital status:      Spouse name: Not on file    Number of children: Not on file    Years of education: Not on file    Highest education level: Not on file   Occupational History    Not on file   Social Needs    Financial resource strain: Not on file    Food insecurity     Worry: Not on file     Inability: Not on file    Transportation needs     Medical: Not on file     Non-medical: Not on file   Tobacco Use    Smoking status: Never Smoker    Smokeless tobacco: Never Used   Substance and Sexual Activity    Alcohol use: No    Drug use: No    Sexual activity: Not on file   Lifestyle    Physical activity     Days per week: Not on file     Minutes per session: Not on file    Stress: Not on file   Relationships    Social connections     Talks on phone: Not on file     Gets together: Not on file     Attends Alevism service: Not on file     Active member of club or organization: Not on file     Attends meetings of clubs or

## 2020-10-12 LAB — CYTOLOGY THIN PREP PAP: NORMAL

## 2020-10-13 ENCOUNTER — TELEPHONE (OUTPATIENT)
Dept: FAMILY MEDICINE CLINIC | Age: 50
End: 2020-10-13

## 2021-03-10 ENCOUNTER — TELEPHONE (OUTPATIENT)
Dept: FAMILY MEDICINE CLINIC | Age: 51
End: 2021-03-10

## 2021-03-10 LAB
ABSOLUTE BASO #: 0 X10E9/L (ref 0–0.2)
ABSOLUTE EOS #: 0.1 X10E9/L (ref 0–0.4)
ABSOLUTE LYMPH #: 2 X10E9/L (ref 1–3.5)
ABSOLUTE MONO #: 0.4 X10E9/L (ref 0–0.9)
ABSOLUTE NEUT #: 2.1 X10E9/L (ref 1.5–6.6)
BASOPHILS RELATIVE PERCENT: 0.9 %
EOSINOPHILS RELATIVE PERCENT: 2.3 %
HCT VFR BLD CALC: 33.6 % (ref 35–47)
HEMOGLOBIN: 11.5 G/DL (ref 11.7–15.5)
LYMPHOCYTE %: 43 %
MCH RBC QN AUTO: 29.4 PG (ref 27–34)
MCHC RBC AUTO-ENTMCNC: 34.1 G/DL (ref 32–36)
MCV RBC AUTO: 86 FL (ref 80–100)
MONOCYTES # BLD: 8.4 %
NEUTROPHILS RELATIVE PERCENT: 45.4 %
PDW BLD-RTO: 14.6 % (ref 11.5–15)
PLATELETS: 239 X10E9/L (ref 150–450)
PMV BLD AUTO: 7.3 FL (ref 7–12)
RBC: 3.9 X10E12/L (ref 3.8–5.2)
WBC: 4.6 X10E9/L (ref 4–11)

## 2021-03-10 NOTE — TELEPHONE ENCOUNTER
----- Message from JOON Galdamez CNP sent at 3/10/2021  8:53 AM EST -----  Let pt know her white blood cell count has normalized, her hgb and hct are about the same continue with the ferrous sulfate bid , she can also take vitamin c supplement with the ferrous sulfate for better absorption of ferrous sulfate. Let me know if she has any questions.

## 2021-10-04 ENCOUNTER — OFFICE VISIT (OUTPATIENT)
Dept: FAMILY MEDICINE CLINIC | Age: 51
End: 2021-10-04
Payer: COMMERCIAL

## 2021-10-04 VITALS
TEMPERATURE: 98.4 F | HEART RATE: 70 BPM | RESPIRATION RATE: 14 BRPM | HEIGHT: 65 IN | BODY MASS INDEX: 23.86 KG/M2 | DIASTOLIC BLOOD PRESSURE: 92 MMHG | SYSTOLIC BLOOD PRESSURE: 158 MMHG | WEIGHT: 143.2 LBS

## 2021-10-04 DIAGNOSIS — Z11.59 ENCOUNTER FOR HEPATITIS C SCREENING TEST FOR LOW RISK PATIENT: ICD-10-CM

## 2021-10-04 DIAGNOSIS — I10 ESSENTIAL HYPERTENSION: ICD-10-CM

## 2021-10-04 DIAGNOSIS — Z13.29 SCREENING FOR THYROID DISORDER: ICD-10-CM

## 2021-10-04 DIAGNOSIS — R42 VERTIGO: ICD-10-CM

## 2021-10-04 DIAGNOSIS — Z00.00 PHYSICAL EXAM: Primary | ICD-10-CM

## 2021-10-04 DIAGNOSIS — R11.0 NAUSEA: ICD-10-CM

## 2021-10-04 PROCEDURE — 99213 OFFICE O/P EST LOW 20 MIN: CPT | Performed by: NURSE PRACTITIONER

## 2021-10-04 PROCEDURE — 99396 PREV VISIT EST AGE 40-64: CPT | Performed by: NURSE PRACTITIONER

## 2021-10-04 RX ORDER — MECLIZINE HCL 12.5 MG/1
12.5 TABLET ORAL 3 TIMES DAILY PRN
Qty: 60 TABLET | Refills: 0 | Status: SHIPPED | OUTPATIENT
Start: 2021-10-04

## 2021-10-04 RX ORDER — LISINOPRIL 10 MG/1
10 TABLET ORAL DAILY
Qty: 90 TABLET | Refills: 3 | Status: SHIPPED | OUTPATIENT
Start: 2021-10-04 | End: 2021-10-19 | Stop reason: SDUPTHER

## 2021-10-04 RX ORDER — ONDANSETRON 4 MG/1
4 TABLET, FILM COATED ORAL DAILY PRN
Qty: 30 TABLET | Refills: 0 | Status: SHIPPED | OUTPATIENT
Start: 2021-10-04 | End: 2022-10-11 | Stop reason: SDUPTHER

## 2021-10-04 ASSESSMENT — PATIENT HEALTH QUESTIONNAIRE - PHQ9
2. FEELING DOWN, DEPRESSED OR HOPELESS: 0
SUM OF ALL RESPONSES TO PHQ QUESTIONS 1-9: 0
1. LITTLE INTEREST OR PLEASURE IN DOING THINGS: 0
SUM OF ALL RESPONSES TO PHQ9 QUESTIONS 1 & 2: 0

## 2021-10-04 NOTE — PROGRESS NOTES
Chief Complaint   Patient presents with    Annual Exam    Medication Refill         SUBJECTIVE:  Grover Mondragon is a 45 y.o. female for physical exam.    Diet - no particular diet  Exercise - no formal exercise program, odes walk a lot   Sleep - sleeps IK  Mood - good    HTN    Does patient check BP regularly at home? - No  Current Medication regimen - lisinopril 10 mg daily   Tolerating medications well? - yes    Shortness of breath or chest pain? No  Headache or visual complaints? No  Neurologic changes like confusion? No  Extremity edema? No    will have vertigo off and on at times, controlled with meclizine, she will also get nauseous when she gets dizzy     BP Readings from Last 3 Encounters:   10/04/21 (!) 158/92   09/29/20 126/80   08/06/20 138/72       Health Maintenance reviewed with patient today.      Past Medical History:   Diagnosis Date    Acid reflux     Gouty arthritis     Hypertension        Past Surgical History:   Procedure Laterality Date    FINGER TRIGGER RELEASE  09/08/2021    [facility]    INNER EAR SURGERY  2015    PARTIAL HYSTERECTOMY      UPPER GASTROINTESTINAL ENDOSCOPY  10/06/2017       Social History     Socioeconomic History    Marital status:      Spouse name: Not on file    Number of children: Not on file    Years of education: Not on file    Highest education level: Not on file   Occupational History    Not on file   Tobacco Use    Smoking status: Never Smoker    Smokeless tobacco: Never Used   Vaping Use    Vaping Use: Never used   Substance and Sexual Activity    Alcohol use: No    Drug use: No    Sexual activity: Not on file   Other Topics Concern    Not on file   Social History Narrative    Not on file     Social Determinants of Health     Financial Resource Strain:     Difficulty of Paying Living Expenses:    Food Insecurity:     Worried About Running Out of Food in the Last Year:     [address] in the Last Year:    Transportation Needs:     Lack of Transportation (Medical):  Lack of Transportation (Non-Medical):    Physical Activity:     Days of Exercise per Week:     Minutes of Exercise per Session:    Stress:     Feeling of Stress :    Social Connections:     Frequency of Communication with Friends and Family:     Frequency of Social Gatherings with Friends and Family:     Attends L[employer] day Services:     Active Member of Clubs or Organizations:     Attends Club or Organization Meetings:     Marital Status:    Intimate Partner Violence:     Fear of Current or Ex-Partner:     Emotionally Abused:     Physically Abused:     Sexually Abused:        Family History   Problem Relation Age of Onset    Cancer Mother     Heart Disease Father            I have reviewed the patient's past medical history, past surgical history, allergies, medications, social and family history and I have made updates where appropriate. PHYSICAL EXAM:  BP (!) 158/92   Pulse 70   Temp 98.4 °F (36.9 °C) (Oral)   Resp 14   Ht 5' 5\" (1.651 m)   Wt 143 lb 3.2 oz (65 kg)   BMI 23.83 kg/m²       Physical Exam  Vitals and nursing note reviewed. Constitutional:       Appearance: She is not ill-appearing. HENT:      Right Ear: Tympanic membrane, ear canal and external ear normal.      Left Ear: Tympanic membrane, ear canal and external ear normal.      Nose: Nose normal.      Mouth/Throat:      Mouth: Mucous membranes are moist.   Cardiovascular:      Rate and Rhythm: Normal rate and regular rhythm. Pulses: Normal pulses. Heart sounds: Normal heart sounds. No murmur heard. Pulmonary:      Effort: Pulmonary effort is normal. No respiratory distress. Breath sounds: Normal breath sounds. No wheezing. Abdominal:      General: Abdomen is flat. Bowel sounds are normal. There is no distension. Palpations: Abdomen is soft. Skin:     General: Skin is warm and dry. Capillary Refill: Capillary refill takes less than 2 seconds. Neurological:      General: No focal deficit present. Mental Status: She is alert and oriented to person, place, and time. Psychiatric:         Mood and Affect: Mood normal.         Behavior: Behavior normal.         Thought Content: Thought content normal.         Judgment: Judgment normal.             ASSESSMENT & PLAN  Vladimir Bowles was seen today for annual exam and medication refill. Diagnoses and all orders for this visit:    Physical exam  -     CBC With Auto Differential; Future  -     Comprehensive Metabolic Panel; Future    Essential hypertension  -     lisinopril (PRINIVIL;ZESTRIL) 10 MG tablet; Take 1 tablet by mouth daily  -     Comprehensive Metabolic Panel; Future  -     Lipid Panel; Future  Worsening   Will come back for bp check, if still elevated will increase to 20 mg daily   Nausea  -     ondansetron (ZOFRAN) 4 MG tablet; Take 1 tablet by mouth daily as needed for Nausea or Vomiting    Vertigo  -     meclizine (ANTIVERT) 12.5 MG tablet; Take 1 tablet by mouth 3 times daily as needed (vertigo)    Screening for thyroid disorder  -     TSH With Reflex Ft4; Future    Encounter for hepatitis C screening test for low risk patient  -     Hepatitis C Antibody; Future    Other orders  -     Cancel: [facility])    Pt in agreement with plan     Return in about 2 weeks (around 10/18/2021) for NV for BP check , physical exam in 1 year. Counseling was provided today regarding the following topics: Healthy eating habits, Regular exercise, substance abuse and healthy sleep habits.

## 2021-10-04 NOTE — PATIENT INSTRUCTIONS
Patient Education        Well Visit, Women 48 to 72: Care Instructions  Overview     Well visits can help you stay healthy. Your doctor has checked your overall health and may have suggested ways to take good care of yourself. Your doctor also may have recommended tests. At home, you can help prevent illness with healthy eating, regular exercise, and other steps. Follow-up care is a key part of your treatment and safety. Be sure to make and go to all appointments, and call your doctor if you are having problems. It's also a good idea to know your test results and keep a list of the medicines you take. How can you care for yourself at home? · Get screening tests that you and your doctor decide on. Screening helps find diseases before any symptoms appear. · Eat healthy foods. Choose fruits, vegetables, whole grains, protein, and low-fat dairy foods. Limit fat, especially saturated fat. Reduce salt in your diet. · Limit alcohol. Have no more than 1 drink a day or 7 drinks a week. · Get at least 30 minutes of exercise on most days of the week. Walking is a good choice. You also may want to do other activities, such as running, swimming, cycling, or playing tennis or team sports. · Reach and stay at a healthy weight. This will lower your risk for many problems, such as obesity, diabetes, heart disease, and high blood pressure. · Do not smoke. Smoking can make health problems worse. If you need help quitting, talk to your doctor about stop-smoking programs and medicines. These can increase your chances of quitting for good. · Care for your mental health. It is easy to get weighed down by worry and stress. Learn strategies to manage stress, like deep breathing and mindfulness, and stay connected with your family and community. If you find you often feel sad or hopeless, talk with your doctor. Treatment can help.   · Talk to your doctor about whether you have any risk factors for sexually transmitted infections (STIs). You can help prevent STIs if you wait to have sex with a new partner (or partners) until you've each been tested for STIs. It also helps if you use condoms (male or female condoms) and if you limit your sex partners to one person who only has sex with you. Vaccines are available for some STIs. · If you think you may have a problem with alcohol or drug use, talk to your doctor. This includes prescription medicines (such as amphetamines and opioids) and illegal drugs (such as cocaine and methamphetamine). Your doctor can help you figure out what type of treatment is best for you. · Protect your skin from too much sun. When you're outdoors from 10 a.m. to 4 p.m., stay in the shade or cover up with clothing and a hat with a wide brim. Wear sunglasses that block UV rays. Even when it's cloudy, put broad-spectrum sunscreen (SPF 30 or higher) on any exposed skin. · See a dentist one or two times a year for checkups and to have your teeth cleaned. · Wear a seat belt in the car. When should you call for help? Watch closely for changes in your health, and be sure to contact your doctor if you have any problems or symptoms that concern you. Where can you learn more? Go to https://[employer]. org and sign in to your twidox account. Enter E139 in the [facility] box to learn more about \"Well Visit, Women 50 to 72: Care Instructions. \"     If you do not have an account, please click on the \"Sign Up Now\" link. Current as of: February 11, 2021               Content Version: 13.0  © 1124-6811 Healthwise, Incorporated. Care instructions adapted under license by [facility] ([facility]). If you have questions about a medical condition or this instruction, always ask your healthcare professional. [place] [address] any warranty or liability for your use of this information.

## 2021-10-19 ENCOUNTER — TELEPHONE (OUTPATIENT)
Dept: FAMILY MEDICINE CLINIC | Age: 51
End: 2021-10-19

## 2021-10-19 ENCOUNTER — NURSE ONLY (OUTPATIENT)
Dept: FAMILY MEDICINE CLINIC | Age: 51
End: 2021-10-19
Payer: COMMERCIAL

## 2021-10-19 VITALS — SYSTOLIC BLOOD PRESSURE: 160 MMHG | DIASTOLIC BLOOD PRESSURE: 92 MMHG

## 2021-10-19 DIAGNOSIS — Z23 NEED FOR SHINGLES VACCINE: Primary | ICD-10-CM

## 2021-10-19 DIAGNOSIS — I10 ESSENTIAL HYPERTENSION: ICD-10-CM

## 2021-10-19 PROCEDURE — 90750 HZV VACC RECOMBINANT IM: CPT | Performed by: NURSE PRACTITIONER

## 2021-10-19 PROCEDURE — 90471 IMMUNIZATION ADMIN: CPT | Performed by: NURSE PRACTITIONER

## 2021-10-19 RX ORDER — LISINOPRIL 20 MG/1
20 TABLET ORAL DAILY
Qty: 90 TABLET | Refills: 1 | Status: SHIPPED | OUTPATIENT
Start: 2021-10-19 | End: 2022-06-06

## 2021-10-19 NOTE — TELEPHONE ENCOUNTER
Pt came in today for a shingrix shot and b/p check. Pt states she is taking lisinopril. States she sometimes get's headaches. Told pt we would call her with any changes.          BP Readings from Last 3 Encounters:   10/19/21 (!) 160/92   10/04/21 (!) 158/92   09/29/20 126/80         Immunization(s) given during visit:    Immunizations Administered     Name Date Dose Route    Zoster Recombinant (Shingrix) 10/19/2021 0.5 mL Intramuscular    Site: Deltoid- Left    Lot: [id number]    NDC: 46435-080-10

## 2021-10-19 NOTE — TELEPHONE ENCOUNTER
Pt on nurse visit schedule to have 2nd shingrix vaccine today.  Her last does was 9/29/2020    Please advise

## 2021-10-19 NOTE — TELEPHONE ENCOUNTER
I increased her lisinopril to 20 mg daily an have her return for a NV for BP check.  New script sent in

## 2021-10-19 NOTE — PROGRESS NOTES
Pt came in today for a shingrix shot and b/p check. Pt states she is taking lisinopril. States she sometimes get's headaches. Told pt we would call her with any changes.          BP Readings from Last 3 Encounters:   10/19/21 (!) 160/92   10/04/21 (!) 158/92   09/29/20 126/80         Immunization(s) given during visit:    Immunizations Administered     Name Date Dose Route    Zoster Recombinant (Shingrix) 10/19/2021 0.5 mL Intramuscular    Site: Deltoid- Left    Lot: [id number]    NDC: 79574-148-42

## 2021-10-20 ENCOUNTER — TELEPHONE (OUTPATIENT)
Dept: FAMILY MEDICINE CLINIC | Age: 51
End: 2021-10-20

## 2021-10-20 NOTE — TELEPHONE ENCOUNTER
Left detailed message. Okay per HIPAA.  Request pt to call back to schedule nurse visit    My chart message sent

## 2021-10-20 NOTE — TELEPHONE ENCOUNTER
It is noted her BP here yesterday was in the 160 range, it is possible her BP cuff is not accurate? Recommend another nurse visit with her home cuff compared to our BP measurement.  thanks

## 2021-10-20 NOTE — TELEPHONE ENCOUNTER
Pt called and ask if she should continue to take the medication lisinopril,  even if her bp was 126/76 pulse 61 at home     This morning 130/79 pulse 63     Pt states that she has been in pain due to her recent thumb surgery.      Would like a reply in [place]      Future Appointments   Date Time Provider Jair Banks   11/1/2021  3:00 PM SCHEDULE, NURSE [facility]   10/6/2022  3:20 PM JOON Moreau

## 2021-10-21 ENCOUNTER — TELEPHONE (OUTPATIENT)
Dept: FAMILY MEDICINE CLINIC | Age: 51
End: 2021-10-21

## 2021-10-21 NOTE — TELEPHONE ENCOUNTER
Pt informed and verbalized understanding.   Pt states the dry heaves are gone and the dizziness is improving

## 2021-10-21 NOTE — TELEPHONE ENCOUNTER
Pt c/o being dizzy,dry heaves after starting new dose 20 mg of lisinopril yesterday  B/p 135/78 p 64

## 2021-10-21 NOTE — TELEPHONE ENCOUNTER
Have her take 1.5 tablets of the 10 mg(her old dose) and see if that does not cause any dizziness, her her check her BP 2 hours after that dose and contact us to let us know what it is and how she is feeling. Are they dry heaves and dizziness gone today?  thanks

## 2021-11-01 ENCOUNTER — TELEPHONE (OUTPATIENT)
Dept: FAMILY MEDICINE CLINIC | Age: 51
End: 2021-11-01

## 2021-11-01 ENCOUNTER — NURSE ONLY (OUTPATIENT)
Dept: FAMILY MEDICINE CLINIC | Age: 51
End: 2021-11-01

## 2021-11-01 VITALS — SYSTOLIC BLOOD PRESSURE: 154 MMHG | DIASTOLIC BLOOD PRESSURE: 84 MMHG

## 2021-11-01 RX ORDER — MULTIVIT WITH MINERALS/LUTEIN
250 TABLET ORAL DAILY
COMMUNITY

## 2021-11-01 NOTE — PROGRESS NOTES
Pt brought in paper towel with b/p throughout the day. Denies headaches and lightheaded.   9:00 Am 116/75  12:00 Pm 1127/81  1:30 pm 124/76  2:20 pm 130/78      BP Readings from Last 3 Encounters:   11/01/21 (!) 154/84   10/19/21 (!) 160/92   10/04/21 (!) 158/92

## 2021-11-01 NOTE — TELEPHONE ENCOUNTER
If I understand correctly the first 3 BPs are from her cuff and the next 3 listed in red have been done in our office? It appears her office BP's always high. Is she taking the lisinopril 20 mg daily?  Thanks

## 2021-11-01 NOTE — TELEPHONE ENCOUNTER
Pt came in for a blood pressure visit and brought in paper towel with b/p throughout the day. Denies any headaches or lightheaded.   9:00 Am 116/75  12:00 Pm 1127/81  1:30 pm 124/76  2:20 pm 130/78      BP Readings from Last 3 Encounters:   11/01/21 (!) 154/84   10/19/21 (!) 160/92   10/04/21 (!) 158/92

## 2021-11-02 NOTE — TELEPHONE ENCOUNTER
If only taking 15 mg,  of lisinopril, it is not enough as her bp was very high I the office, I recommend she take the whole 20 mg tablet and come back in 2 weeks for a NV for another BP check and then bring her cuff along to compare numbers as her numbers seem to be very different than ours and want to make sure she is getting accurate readings.  Thanks

## 2021-11-02 NOTE — TELEPHONE ENCOUNTER
Yes, first 4 readings are from her paper towel she brought in and she is taking 15mg every day. 20 mg was to much for her so she is taking 1 and 1/2 tablets a day.

## 2021-11-02 NOTE — TELEPHONE ENCOUNTER
Patient stopped at the office; patient told to start taking 20 mg lisinopril/day for the next 2 weeks and why. Patient set up for BP reading with nurses on 11/16/21. She will be a little late as she is coming directly from work. Patient will bring her BP cuff.

## 2021-11-05 ENCOUNTER — TELEPHONE (OUTPATIENT)
Dept: FAMILY MEDICINE CLINIC | Age: 51
End: 2021-11-05

## 2021-11-05 NOTE — TELEPHONE ENCOUNTER
----- Message from JOON Lopez CNP sent at 11/5/2021 10:25 AM EDT -----  Let pt know mammogram is normal recommend a 1 yr f/u

## 2021-11-16 ENCOUNTER — TELEPHONE (OUTPATIENT)
Dept: FAMILY MEDICINE CLINIC | Age: 51
End: 2021-11-16

## 2021-11-16 ENCOUNTER — NURSE ONLY (OUTPATIENT)
Dept: FAMILY MEDICINE CLINIC | Age: 51
End: 2021-11-16

## 2021-11-16 VITALS — DIASTOLIC BLOOD PRESSURE: 68 MMHG | SYSTOLIC BLOOD PRESSURE: 138 MMHG

## 2021-11-16 DIAGNOSIS — I10 ESSENTIAL HYPERTENSION: Primary | ICD-10-CM

## 2021-11-17 NOTE — TELEPHONE ENCOUNTER
Has she been taking the 20 mg of lisinopril daily and did she take it prior t her NV BP check ? If so I recommend increasing the lisinopril to 30 gm daily as her bp continues to be high.

## 2021-11-17 NOTE — TELEPHONE ENCOUNTER
Pt informed and understand with no further questions at this time. Pt states she does take her lisinopril daily and takes it in the morning. She did take it prior to her appt yesterday.  Pt is increasing to 30mg

## 2021-11-19 ENCOUNTER — NURSE TRIAGE (OUTPATIENT)
Dept: OTHER | Facility: CLINIC | Age: 51
End: 2021-11-19

## 2021-11-19 NOTE — TELEPHONE ENCOUNTER
Received call from [facility] with Red Flag Complaint. Brief description of triage: since taking Lisinopril 30mg, dizzy, vomiting, diarrhea, headache since change in dose yesterday, was told to call practice if any dizziness occurs. Calling PCP office, called PCP office and no answer, had ECC try, no answer, patient states she is cutting back to her 20mg from 30mg, advised if any issues to please call back, pt states her office nurse asked her to call if any dizziness, with not being able to reach the practice, gave patient instructions to [facility] or Walk in if any further symptoms. Patient states understanding. Care advice provided, patient verbalizes understanding; denies any other questions or concerns; instructed to call back for any new or worsening symptoms. Attention Provider: Thank you for allowing me to participate in the care of your patient. The patient was connected to triage in response to information provided to the ECC/PSC. Please do not respond through this encounter as the response is not directed to a shared pool.         Reason for Disposition   Medication questions   [1] Caller has NON-URGENT medication question about med that PCP prescribed AND [2] triager unable to answer question    Protocols used: INFORMATION ONLY CALL - NO TRIAGE-ADULT-AH, MEDICATION QUESTION CALL-ADULT-

## 2021-11-22 ENCOUNTER — TELEPHONE (OUTPATIENT)
Dept: FAMILY MEDICINE CLINIC | Age: 51
End: 2021-11-22

## 2021-11-22 NOTE — TELEPHONE ENCOUNTER
What was her blood pressure at this time when she was dizzy/  Did it go away? How feeling today?  thanks

## 2021-11-22 NOTE — TELEPHONE ENCOUNTER
----- Message from Ori Morgan sent at 11/19/2021  2:21 PM EST -----  Subject: Medication Problem    QUESTIONS  Name of Medication? lisinopril (PRINIVIL;ZESTRIL) 20 MG tablet  Patient-reported dosage and instructions? 30 mg  What question or problem do you have with the medication? Increased   ingestion of Lisinopril has the following side effects x 1 day  Dizzy,   Diarrhea, HA, Vomiting  Preferred Pharmacy? [facility] [place] [address] phone number (if available)? [phone]  Additional Information for Provider? Increased ingestion of Lisinopril has   the following side effects x 1 day  Dizzy, Diarrhea, HA, Vomiting  ---------------------------------------------------------------------------  --------------  CALL BACK INFO  What is the best way for the office to contact you? OK to leave message on   voicemail  Preferred Call Back Phone Number? [phone]  ---------------------------------------------------------------------------  --------------  SCRIPT ANSWERS  Relationship to Patient? Self  Are you having trouble breathing? No  Do you have swelling of your face, tongue, or anywhere? No  (Is the patient requesting to be seen urgently for their symptoms?)? No  (If answered No to all RN Triage questions send Message to Provider)? Yes  Have you been diagnosed with, awaiting test results for, or told that you   are suspected of having COVID-19 (Coronavirus)? (If patient has tested   negative or was tested as a requirement for work, school, or travel and   not based on symptoms, answer no)? No  Within the past two weeks have you developed any of the following symptoms   (answer no if symptoms have been present longer than 2 weeks or began   more than 2 weeks ago)?  Fever or Chills, Cough, Shortness of breath or   difficulty breathing, Loss of taste or smell, Sore throat, Nasal   congestion, Sneezing or runny nose, Fatigue or generalized body aches   (answer no if pain is specific to a body part e.g. back pain), Diarrhea,   Headache? No  Have you had close contact with someone with COVID-19 in the last 14 days? No  (Service Expert - click yes below to proceed with [employer] As Usual   Scheduling)?  Yes

## 2021-11-23 NOTE — TELEPHONE ENCOUNTER
Pt states her b/p was 124/74 when she was dizzy, the dizziness is gone, pt feels pretty good today    She was taking 30 mg lisinopril, and she backed down to 20 mg lisinopril and she feels better now.

## 2022-03-30 ENCOUNTER — TELEPHONE (OUTPATIENT)
Dept: FAMILY MEDICINE CLINIC | Age: 52
End: 2022-03-30

## 2022-03-30 DIAGNOSIS — D72.819 LEUKOPENIA, UNSPECIFIED TYPE: Primary | ICD-10-CM

## 2022-03-30 LAB
ABSOLUTE BASO #: 0 X10E9/L (ref 0–0.2)
ABSOLUTE EOS #: 0.1 X10E9/L (ref 0–0.4)
ABSOLUTE LYMPH #: 1.2 X10E9/L (ref 1–3.5)
ABSOLUTE MONO #: 0.3 X10E9/L (ref 0–0.9)
ABSOLUTE NEUT #: 1.8 X10E9/L (ref 1.5–6.6)
ALBUMIN SERPL-MCNC: 5 G/DL (ref 3.2–5.3)
ALK PHOSPHATASE: 73 U/L (ref 39–130)
ALT SERPL-CCNC: 15 U/L (ref 0–31)
ANION GAP SERPL CALCULATED.3IONS-SCNC: 12 MMOL/L (ref 5–15)
AST SERPL-CCNC: 18 U/L (ref 0–41)
BASOPHILS RELATIVE PERCENT: 1 %
BILIRUB SERPL-MCNC: 0.5 MG/DL (ref 0.3–1.2)
BUN BLDV-MCNC: 8 MG/DL (ref 5–23)
CALCIUM SERPL-MCNC: 10.4 MG/DL (ref 8.5–10.5)
CHLORIDE BLD-SCNC: 104 MMOL/L (ref 98–109)
CHOLESTEROL/HDL RATIO: 2.3 (ref 1–5)
CHOLESTEROL: 202 MG/DL (ref 150–200)
CO2: 28 MMOL/L (ref 22–32)
CREAT SERPL-MCNC: 0.61 MG/DL (ref 0.4–1)
EGFR AFRICAN AMERICAN: >60 ML/MIN/1.73SQ.M
EGFR IF NONAFRICAN AMERICAN: >60 ML/MIN/1.73SQ.M
EOSINOPHILS RELATIVE PERCENT: 2 %
GLUCOSE: 90 MG/DL (ref 65–99)
HCT VFR BLD CALC: 37.1 % (ref 35–47)
HDLC SERPL-MCNC: 89 MG/DL
HEMOGLOBIN: 12.2 G/DL (ref 11.7–15.5)
HEPATITIS C ANTIBODY: NORMAL
LDL CHOLESTEROL CALCULATED: 105 MG/DL
LDL/HDL RATIO: 1.2
LYMPHOCYTE %: 36.1 %
MCH RBC QN AUTO: 28.2 PG (ref 27–34)
MCHC RBC AUTO-ENTMCNC: 32.9 G/DL (ref 32–36)
MCV RBC AUTO: 86 FL (ref 80–100)
MONOCYTES # BLD: 7.9 %
NEUTROPHILS RELATIVE PERCENT: 53 %
PDW BLD-RTO: 14.8 % (ref 11.5–15)
PLATELETS: 253 X10E9/L (ref 150–450)
PMV BLD AUTO: 7.8 FL (ref 7–12)
POTASSIUM SERPL-SCNC: 4.2 MMOL/L (ref 3.5–5)
RBC: 4.33 X10E12/L (ref 3.8–5.2)
SODIUM BLD-SCNC: 144 MMOL/L (ref 134–146)
TOTAL PROTEIN: 8 G/DL (ref 6–8)
TRIGL SERPL-MCNC: 38 MG/DL (ref 27–150)
TSH SERPL DL<=0.05 MIU/L-ACNC: 1.92 UIU/ML (ref 0.49–4.67)
VLDLC SERPL CALC-MCNC: 8 MG/DL (ref 0–30)
WBC: 3.3 X10E9/L (ref 4–11)

## 2022-03-30 NOTE — TELEPHONE ENCOUNTER
----- Message from JOON Jacques CNP sent at 3/30/2022  8:12 AM EDT -----  Let pt know her labs are back her WBC have dropped again as they did a year ago but then increased. It does look like she has low  WBC's from time to time all of the other values in her CBC are normal. We will recheck her WBC in 1 month, orders placed . TSH normal, hep c negative, renal function normal glucose normal, cholesterol slightly elevated at 202 does not need meds at this point, be active. Ask if any questions.

## 2022-05-25 ENCOUNTER — TELEPHONE (OUTPATIENT)
Dept: FAMILY MEDICINE CLINIC | Age: 52
End: 2022-05-25

## 2022-05-25 LAB
ABSOLUTE BASO #: 0 X10E9/L (ref 0–0.2)
ABSOLUTE EOS #: 0.1 X10E9/L (ref 0–0.4)
ABSOLUTE LYMPH #: 1.7 X10E9/L (ref 1–3.5)
ABSOLUTE MONO #: 0.3 X10E9/L (ref 0–0.9)
ABSOLUTE NEUT #: 1.8 X10E9/L (ref 1.5–6.6)
BASOPHILS RELATIVE PERCENT: 0.9 %
EOSINOPHILS RELATIVE PERCENT: 2.8 %
HCT VFR BLD CALC: 39.6 % (ref 35–47)
HEMOGLOBIN: 13.3 G/DL (ref 11.7–15.5)
LYMPHOCYTE %: 42.5 %
MCH RBC QN AUTO: 28.5 PG (ref 27–34)
MCHC RBC AUTO-ENTMCNC: 33.5 G/DL (ref 32–36)
MCV RBC AUTO: 85 FL (ref 80–100)
MONOCYTES # BLD: 7.4 %
NEUTROPHILS RELATIVE PERCENT: 46.4 %
PDW BLD-RTO: 14 % (ref 11.5–15)
PLATELETS: 276 X10E9/L (ref 150–450)
PMV BLD AUTO: 7.3 FL (ref 7–12)
RBC: 4.65 X10E12/L (ref 3.8–5.2)
WBC: 4 X10E9/L (ref 4–11)

## 2022-05-25 NOTE — TELEPHONE ENCOUNTER
----- Message from JOON Flower CNP sent at 5/25/2022 12:46 PM EDT -----  Let pt know her white count has come back into normal range. This is good, no need to repeat any labs at this time. , it could have been low due to a virus but she has recovered.

## 2022-06-03 DIAGNOSIS — I10 ESSENTIAL HYPERTENSION: ICD-10-CM

## 2022-06-06 RX ORDER — LISINOPRIL 20 MG/1
TABLET ORAL
Qty: 90 TABLET | Refills: 1 | Status: SHIPPED | OUTPATIENT
Start: 2022-06-06 | End: 2022-10-06 | Stop reason: SDUPTHER

## 2022-06-06 NOTE — TELEPHONE ENCOUNTER
Recent Visits  Date Type Provider Dept   10/04/21 Office Visit JOON Beck CNP [facility]   Showing recent visits within past 540 days with a meds authorizing provider and meeting all other requirements  Future Appointments  Date Type Provider Dept   10/06/22 Appointment JOON Beck CNP [facility]   Showing future appointments within next 150 days with a meds authorizing provider and meeting all other requirements      Future Appointments   Date Time Provider Jair Banks   9/8/2022  1:00 PM DO AFSHAN Healy [facility] [place] [facility]   10/6/2022  3:20 PM JOON Beck

## 2022-07-20 ENCOUNTER — NURSE ONLY (OUTPATIENT)
Dept: LAB | Age: 52
End: 2022-07-20

## 2022-07-20 ENCOUNTER — OFFICE VISIT (OUTPATIENT)
Dept: RHEUMATOLOGY | Age: 52
End: 2022-07-20
Payer: COMMERCIAL

## 2022-07-20 VITALS
HEIGHT: 65 IN | WEIGHT: 132.4 LBS | DIASTOLIC BLOOD PRESSURE: 80 MMHG | HEART RATE: 99 BPM | BODY MASS INDEX: 22.06 KG/M2 | SYSTOLIC BLOOD PRESSURE: 152 MMHG | OXYGEN SATURATION: 98 %

## 2022-07-20 DIAGNOSIS — R76.8 POSITIVE ANA (ANTINUCLEAR ANTIBODY): ICD-10-CM

## 2022-07-20 DIAGNOSIS — R76.8 POSITIVE ANA (ANTINUCLEAR ANTIBODY): Primary | ICD-10-CM

## 2022-07-20 PROCEDURE — 99204 OFFICE O/P NEW MOD 45 MIN: CPT | Performed by: INTERNAL MEDICINE

## 2022-07-20 ASSESSMENT — ENCOUNTER SYMPTOMS
SHORTNESS OF BREATH: 0
COUGH: 0
VOMITING: 0
BLOOD IN STOOL: 0
WHEEZING: 0
NAUSEA: 0
ABDOMINAL PAIN: 0

## 2022-07-20 NOTE — PROGRESS NOTES
[facility]) Physicians Rheumatology  Rheumatology Consult Note      7/20/2022       Reason for Consult:  positive ISRRAEL  Requesting Physician:  Genevieve Brannon MD     CHIEF COMPLAINT:    Chief Complaint   Patient presents with    New Patient     RA         History Obtained From:  patient      HISTORY OF PRESENT ILLNESS:    45 y.o. female presents today for evaluation of positive ISRRAEL. Reports having blood test that showed positive rheumatoid arthritis. This test was ordered by orthopedics when pt had multiple surgeries to the right thumb with poor healing and complications. She has been off work since February. Had 5 surgeries to the right thumb. Started with trigger finger surgery. Then another surgery for revision of trigger finger of the thumb and carpal tunnel. Had a complications of fracture of the thumb. Had another surgery with pins-placed. Then another procedure to take the pins out. Reports that she is having difficulty gripping objects as her thumb and index finger do not bend completely. This prompted work up by orthopedics, and was found to have positive ISRRAEL, negative RF and anti-CCP. She denies having any significant joint pain. No joint swelling. No prolonged morning stiffness. No skin rash. No oral ulcers. No alopecia. Possible Raynaud's. No dysphagia. No dry eyes or dry mouth. Past Medical History:     has a past medical history of Acid reflux, Gouty arthritis, and Hypertension. Past Surgical History:     has a past surgical history that includes Inner ear surgery (2015); Finger trigger release (09/08/2021); Partial hysterectomy; and Upper gastrointestinal endoscopy (10/06/2017).     Current Medications:      Current Outpatient Medications:     lisinopril (PRINIVIL;ZESTRIL) 20 MG tablet, take 1 tablet by mouth once daily, Disp: 90 tablet, Rfl: 1    Ascorbic Acid (VITAMIN C) 250 MG tablet, Take 250 mg by mouth daily, Disp: , Rfl:     Multiple Vitamin (MULTIVITAMIN ADULT PO), General: No swelling, tenderness or deformity. Normal range of motion. Cervical back: Normal range of motion and neck supple. Right lower leg: No edema. Left lower leg: No edema. Comments: Has wasting in right thennar muscles. No synovitis or tenderness in small joints of hands, wrists, elbows, shoulders. Decreased flexion range of the right thumb and index fingers. Skin:     General: Skin is warm and dry. Findings: No lesion or rash. Neurological:      General: No focal deficit present. Mental Status: She is alert and oriented to person, place, and time. Mental status is at baseline. Cranial Nerves: No cranial nerve deficit. Gait: Gait normal.   Psychiatric:         Mood and Affect: Mood normal.          DATA:    Labs were reviewed. IMPRESSION/RECOMMENDATIONS:      1. Positive ISRRAEL: level of suspicion for underlying connective tissue disease is low. She is not exhibiting classic features. She does have wasting in her hand musles and decreased flesion range of the right thumb and index digits. Likely related to repeated surgeries and possibly early secondary OA. -- will assess UrtakSE CTD and call pt with results        Orders Placed This Encounter   Procedures    Miscellaneous Sendout     Standing Status:   Future     Standing Expiration Date:   1/20/2023     Order Specific Question:   Specify Req.  Test (1 Test/Order)     Answer:   UrtakSE CTD PANEL        Memo Patel MD    [address]   [address] [facility]. [address] [facility]  [address] [place] [id number] [phone]

## 2022-08-04 ENCOUNTER — OFFICE VISIT (OUTPATIENT)
Dept: RHEUMATOLOGY | Age: 52
End: 2022-08-04
Payer: COMMERCIAL

## 2022-08-04 VITALS
OXYGEN SATURATION: 98 % | HEART RATE: 82 BPM | BODY MASS INDEX: 21.99 KG/M2 | SYSTOLIC BLOOD PRESSURE: 164 MMHG | DIASTOLIC BLOOD PRESSURE: 88 MMHG | WEIGHT: 132 LBS | HEIGHT: 65 IN

## 2022-08-04 DIAGNOSIS — R76.8 POSITIVE ANA (ANTINUCLEAR ANTIBODY): Primary | ICD-10-CM

## 2022-08-04 DIAGNOSIS — M18.11 OSTEOARTHRITIS OF THUMB, RIGHT: ICD-10-CM

## 2022-08-04 PROCEDURE — 99213 OFFICE O/P EST LOW 20 MIN: CPT | Performed by: INTERNAL MEDICINE

## 2022-08-04 ASSESSMENT — ENCOUNTER SYMPTOMS
COUGH: 0
NAUSEA: 0
VOMITING: 0
ABDOMINAL PAIN: 0
SHORTNESS OF BREATH: 0

## 2022-08-04 NOTE — PROGRESS NOTES
[facility]) Physicians Rheumatology  Rheumatology Consult Note      8/4/2022       Reason for Consult:  positive ISRRAEL  Requesting Physician:  No ref. provider found     CHIEF COMPLAINT:    Chief Complaint   Patient presents with    Follow-up     2 week         History Obtained From:  patient      HISTORY OF PRESENT ILLNESS:    45 y.o. female presents today for evaluation of positive ISRRAEL. In last office visit, labs were ordered. She denies having any dry eyes or dry mouth. No oral ulcers. Recap:  Reports having blood test that showed positive rheumatoid arthritis. This test was ordered by orthopedics when pt had multiple surgeries to the right thumb with poor healing and complications. She has been off work since February. Had 5 surgeries to the right thumb. Started with trigger finger surgery. Then another surgery for revision of trigger finger of the thumb and carpal tunnel. Had a complications of fracture of the thumb. Had another surgery with pins-placed. Then another procedure to take the pins out. Reports that she is having difficulty gripping objects as her thumb and index finger do not bend completely. This prompted work up by orthopedics, and was found to have positive ISRRAEL, negative RF and anti-CCP. She denies having any significant joint pain. No joint swelling. No prolonged morning stiffness. No skin rash. No oral ulcers. No alopecia. No dysphagia. No dry eyes or dry mouth. Past Medical History:     has a past medical history of Acid reflux, Gouty arthritis, and Hypertension. Past Surgical History:     has a past surgical history that includes Inner ear surgery (2015); Finger trigger release (09/08/2021); Partial hysterectomy; and Upper gastrointestinal endoscopy (10/06/2017).     Current Medications:      Current Outpatient Medications:     lisinopril (PRINIVIL;ZESTRIL) 20 MG tablet, take 1 tablet by mouth once daily, Disp: 90 tablet, Rfl: 1    Ascorbic Acid (VITAMIN C) 250 MG tablet, Take 250 mg by mouth daily, Disp: , Rfl:     Multiple Vitamin (MULTIVITAMIN ADULT PO), Take 1 tablet by mouth daily, Disp: , Rfl:     ondansetron (ZOFRAN) 4 MG tablet, Take 1 tablet by mouth daily as needed for Nausea or Vomiting, Disp: 30 tablet, Rfl: 0    meclizine (ANTIVERT) 12.5 MG tablet, Take 1 tablet by mouth 3 times daily as needed (vertigo), Disp: 60 tablet, Rfl: 0    ferrous sulfate (FE TABS) 325 (65 Fe) MG EC tablet, Take 1 tablet by mouth 2 times daily, Disp: 60 tablet, Rfl: 5    Cholecalciferol (VITAMIN D) 2000 units TABS tablet, Take 1 tablet by mouth daily, Disp: 90 tablet, Rfl: 3    Allergies:    Patient has no known allergies. Social History:     reports that she has never smoked. She has never used smokeless tobacco. She reports that she does not drink alcohol and does not use drugs. Family History:   family history includes Cancer in her mother; Heart Disease in her father. REVIEW OF SYSTEMS:    Review of Systems   Constitutional:  Negative for chills and fever. HENT:  Negative for mouth sores. Respiratory:  Negative for cough and shortness of breath. Cardiovascular:  Negative for chest pain. Gastrointestinal:  Negative for abdominal pain, nausea and vomiting. Skin:  Negative for rash. PHYSICAL EXAM:    Vitals:    BP (!) 164/88 (Site: Left Upper Arm, Position: Sitting, Cuff Size: Medium Adult)   Pulse 82   Ht 5' 5\" (1.651 m)   Wt 132 lb (59.9 kg)   SpO2 98%   BMI 21.97 kg/m²     Physical Exam  Constitutional:       General: She is not in acute distress. Appearance: Normal appearance. Eyes:      Conjunctiva/sclera: Conjunctivae normal.   Pulmonary:      Effort: Pulmonary effort is normal.   Musculoskeletal:         General: No swelling, tenderness or deformity. Normal range of motion. Right lower leg: No edema. Left lower leg: No edema. Comments: Has wasting in right thennar muscles.   No synovitis or tenderness in small joints of hands, wrists. Decreased flexion range of the right thumb and index fingers. Shiny tight skin on the right thumb and index finger. Skin:     General: Skin is warm and dry. Findings: No rash. Neurological:      General: No focal deficit present. Mental Status: She is alert and oriented to person, place, and time. Gait: Gait normal.   Psychiatric:         Mood and Affect: Mood normal.          DATA:    Labs were reviewed. IMPRESSION/RECOMMENDATIONS:      1. Positive ISRRAEL: reviewed with pt her lab results. Discussed with her that the positive ISRRAEL is likely a false positive result. She is not having features of CTD. No orders of the defined types were placed in this encounter.        Leandrew Olszewski, MD    [address] [facility]  [address] [facility]. [address] [facility]  [address]  [phone]

## 2022-10-06 ENCOUNTER — OFFICE VISIT (OUTPATIENT)
Dept: FAMILY MEDICINE CLINIC | Age: 52
End: 2022-10-06
Payer: COMMERCIAL

## 2022-10-06 VITALS
WEIGHT: 128.2 LBS | HEIGHT: 65 IN | SYSTOLIC BLOOD PRESSURE: 148 MMHG | BODY MASS INDEX: 21.36 KG/M2 | TEMPERATURE: 98.2 F | HEART RATE: 105 BPM | OXYGEN SATURATION: 93 % | RESPIRATION RATE: 14 BRPM | DIASTOLIC BLOOD PRESSURE: 86 MMHG

## 2022-10-06 DIAGNOSIS — I10 ESSENTIAL HYPERTENSION: ICD-10-CM

## 2022-10-06 DIAGNOSIS — Z13.31 DEPRESSION SCREENING NEGATIVE: ICD-10-CM

## 2022-10-06 DIAGNOSIS — Z00.00 ENCOUNTER FOR WELL ADULT EXAM WITHOUT ABNORMAL FINDINGS: Primary | ICD-10-CM

## 2022-10-06 PROCEDURE — 99396 PREV VISIT EST AGE 40-64: CPT | Performed by: NURSE PRACTITIONER

## 2022-10-06 RX ORDER — LISINOPRIL 30 MG/1
TABLET ORAL
Qty: 90 TABLET | Refills: 4 | Status: SHIPPED | OUTPATIENT
Start: 2022-10-06

## 2022-10-06 SDOH — ECONOMIC STABILITY: FOOD INSECURITY: WITHIN THE PAST 12 MONTHS, THE FOOD YOU BOUGHT JUST DIDN'T LAST AND YOU DIDN'T HAVE MONEY TO GET MORE.: NEVER TRUE

## 2022-10-06 SDOH — ECONOMIC STABILITY: FOOD INSECURITY: WITHIN THE PAST 12 MONTHS, YOU WORRIED THAT YOUR FOOD WOULD RUN OUT BEFORE YOU GOT MONEY TO BUY MORE.: NEVER TRUE

## 2022-10-06 ASSESSMENT — PATIENT HEALTH QUESTIONNAIRE - PHQ9
1. LITTLE INTEREST OR PLEASURE IN DOING THINGS: 0
SUM OF ALL RESPONSES TO PHQ QUESTIONS 1-9: 0
2. FEELING DOWN, DEPRESSED OR HOPELESS: 0
SUM OF ALL RESPONSES TO PHQ QUESTIONS 1-9: 0
DEPRESSION UNABLE TO ASSESS: FUNCTIONAL CAPACITY MOTIVATION LIMITS ACCURACY
SUM OF ALL RESPONSES TO PHQ9 QUESTIONS 1 & 2: 0

## 2022-10-06 ASSESSMENT — ENCOUNTER SYMPTOMS
RESPIRATORY NEGATIVE: 1
GASTROINTESTINAL NEGATIVE: 1

## 2022-10-06 ASSESSMENT — SOCIAL DETERMINANTS OF HEALTH (SDOH): HOW HARD IS IT FOR YOU TO PAY FOR THE VERY BASICS LIKE FOOD, HOUSING, MEDICAL CARE, AND HEATING?: NOT HARD AT ALL

## 2022-10-06 NOTE — PROGRESS NOTES
Well Adult Note  Name: Mac Edward Date: 10/6/2022   MRN: [id number] Sex: Female   Age: 47 y.o. Ethnicity: Non- / Non    : 1970 Race: John Marie / Claude Ocampo is here for well adult exam.  History:  Pt eats a wide variety of foods, does try to eat all food groups. She does not have a formal exercise program      Denies sleeping problems. HTN    Does patient check BP regularly at home? - Yes  Current Medication regimen - lisinopril 20 mg bp elevated today will increase to 30 mg daily   Tolerating medications well? - yes    Shortness of breath or chest pain? No  Headache or visual complaints? No  Neurologic changes like confusion? No  Extremity edema?  No    Lab Results   Component Value Date    CHOL 202 (H) 2022    CHOL 198 2020    CHOL 207 (H) 2018     Lab Results   Component Value Date    TRIG 38 2022    TRIG 35 2020    TRIG 39 2018     Lab Results   Component Value Date    HDL 89 2022    HDL 98 2020     2018     Lab Results   Component Value Date    LDLCALC 105 2022    LDLCALC 93 2020    LDLCALC 99 2018     Lab Results   Component Value Date    LABVLDL 8 2022    LABVLDL 7 2018     Lab Results   Component Value Date    CHOLHDLRATIO 2.3 2022    CHOLHDLRATIO 2.3 2018        Lab Results   Component Value Date    WBC 4.0 2022    HGB 13.3 2022    HCT 39.6 2022    MCV 85 2022     2022        Lab Results   Component Value Date/Time     2022 09:43 AM    K 4.2 2022 09:43 AM     2022 09:43 AM    CO2 28 2022 09:43 AM    BUN 8 2022 09:43 AM    CREATININE 0.61 2022 09:43 AM    GLUCOSE 90 2022 09:43 AM    CALCIUM 10.4 2022 09:43 AM         Lab Results   Component Value Date    TSH 1.92 2022    T4FREE 1.13 2018        BP Readings from Last 3 Encounters: 10/06/22 (!) 148/86   08/04/22 (!) 164/88   07/20/22 (!) 152/80        Review of Systems   Constitutional:  Negative for chills, fatigue and fever. HENT: Negative. Respiratory: Negative. Cardiovascular: Negative. Gastrointestinal: Negative. Genitourinary: Negative. Musculoskeletal: Negative. Skin: Negative. Neurological:  Negative for dizziness, weakness, light-headedness and headaches. Psychiatric/Behavioral:  Negative for self-injury, sleep disturbance and suicidal ideas. No Known Allergies      Prior to Visit Medications    Medication Sig Taking?  Authorizing Provider   lisinopril (PRINIVIL;ZESTRIL) 20 MG tablet take 1 tablet by mouth once daily Yes JOON Sahu CNP   Ascorbic Acid (VITAMIN C) 250 MG tablet Take 250 mg by mouth daily Yes Historical Provider, MD   Multiple Vitamin (MULTIVITAMIN ADULT PO) Take 1 tablet by mouth daily Yes Historical Provider, MD   ondansetron (ZOFRAN) 4 MG tablet Take 1 tablet by mouth daily as needed for Nausea or Vomiting Yes JOON Sahu CNP   meclizine (ANTIVERT) 12.5 MG tablet Take 1 tablet by mouth 3 times daily as needed (vertigo) Yes JOON Sahu CNP   ferrous sulfate (FE TABS) 325 (65 Fe) MG EC tablet Take 1 tablet by mouth 2 times daily Yes JOON Sahu CNP   Cholecalciferol (VITAMIN D) 2000 units TABS tablet Take 1 tablet by mouth daily Yes JOON Sahu CNP         Past Medical History:   Diagnosis Date    Acid reflux     Gouty arthritis     Hypertension        Past Surgical History:   Procedure Laterality Date    FINGER TRIGGER RELEASE  09/08/2021    [facility]IO    INNER EAR SURGERY  2015    PARTIAL HYSTERECTOMY (CERVIX NOT REMOVED)      UPPER GASTROINTESTINAL ENDOSCOPY  10/06/2017         Family History   Problem Relation Age of Onset    Cancer Mother     Heart Disease Father        Social History     Tobacco Use    Smoking status: Never    Smokeless tobacco: Never   Vaping Use    Vaping Use: Never used   Substance Use Topics    Alcohol use: No    Drug use: No       Objective   BP (!) 148/86   Pulse (!) 105   Temp 98.2 °F (36.8 °C)   Resp 14   Ht 5' 5\" (1.651 m)   Wt 128 lb 3.2 oz (58.2 kg)   SpO2 93%   BMI 21.33 kg/m²   Wt Readings from Last 3 Encounters:   10/06/22 128 lb 3.2 oz (58.2 kg)   08/04/22 132 lb (59.9 kg)   07/20/22 132 lb 6.4 oz (60.1 kg)     There were no vitals filed for this visit. Physical Exam  Vitals and nursing note reviewed. Constitutional:       Appearance: She is not ill-appearing. HENT:      Right Ear: Tympanic membrane, ear canal and external ear normal.      Left Ear: Tympanic membrane, ear canal and external ear normal.      Nose: Nose normal.      Mouth/Throat:      Mouth: Mucous membranes are moist.   Eyes:      Pupils: Pupils are equal, round, and reactive to light. Cardiovascular:      Rate and Rhythm: Normal rate and regular rhythm. Pulses: Normal pulses. Heart sounds: Normal heart sounds. No murmur heard. Pulmonary:      Effort: Pulmonary effort is normal. No respiratory distress. Breath sounds: Normal breath sounds. No wheezing. Abdominal:      General: Abdomen is flat. Bowel sounds are normal. There is no distension. Palpations: Abdomen is soft. Tenderness: There is no abdominal tenderness. Skin:     General: Skin is warm and dry. Capillary Refill: Capillary refill takes less than 2 seconds. Neurological:      General: No focal deficit present. Mental Status: She is alert and oriented to person, place, and time. Psychiatric:         Mood and Affect: Mood normal.         Behavior: Behavior normal.         Thought Content: Thought content normal.         Judgment: Judgment normal.         Assessment   Plan   Encounter Diagnoses   Name Primary?     Encounter for well adult exam without abnormal findings Yes    Depression screening negative     Essential hypertension     BP worsening, increase lisinopril 30 mg daily PAP and Colonoscopy up to date  Mammogram up to date     No orders of the defined types were placed in this encounter. Personalized Preventive Plan   Current Health Maintenance Status  Immunization History   Administered Date(s) Administered    COVID-19, MODERNA BLUE border, Primary or Immunocompromised, (age 12y+), IM, 100 mcg/0.5mL 08/17/2021, 09/17/2021    Td, unspecified formulation 04/04/2014    Tdap (Boostrix, Adacel) 04/06/2014    Zoster Recombinant (Shingrix) 09/29/2020, 10/19/2021        Health Maintenance   Topic Date Due    COVID-19 Vaccine (3 - Booster for Moderna series) 02/17/2022    Flu vaccine (1) Never done    Depression Screen  10/04/2022    Cervical cancer screen  09/29/2023    Breast cancer screen  11/04/2023    DTaP/Tdap/Td vaccine (2 - Td or Tdap) 04/06/2024    Lipids  03/29/2027    Colorectal Cancer Screen  09/24/2029    Shingles vaccine  Completed    Hepatitis C screen  Completed    HIV screen  Completed    Hepatitis A vaccine  Aged Out    Hepatitis B vaccine  Aged Out    Hib vaccine  Aged Out    Meningococcal (ACWY) vaccine  Aged Out    Pneumococcal 0-64 years Vaccine  Aged Out     Recommendations for [employer] Due: see orders and patient instructions/AVS.    No follow-ups on file.

## 2022-10-06 NOTE — PATIENT INSTRUCTIONS
Well Visit, Ages 25 to 72: Care Instructions  Well visits can help you stay healthy. Your doctor has checked your overall health and may have suggested ways to take good care of yourself. Your doctor also may have recommended tests. You can help prevent illness with healthy eating, good sleep, vaccinations, regular exercise, and other steps. Get the tests that you and your doctor decide on. Depending on your age and risks, examples might include screening for diabetes; hepatitis C; HIV; and cervical, breast, lung, and colon cancer. Screening helps find diseases before any symptoms appear. Eat healthy foods. Choose fruits, vegetables, whole grains, lean protein, and low-fat dairy foods. Limit saturated fat and reduce salt. Limit alcohol. Men should have no more than 2 drinks a day. Women should have no more than 1. For some people, no alcohol is the best choice. Exercise. Get at least 30 minutes of exercise on most days of the week. Walking can be a good choice. Reach and stay at your healthy weight. This will lower your risk for many health problems. Take care of your mental health. Try to stay connected with friends, family, and community, and find ways to manage stress. If you're feeling depressed or hopeless, talk to someone. A counselor can help. If you don't have a counselor, talk to your doctor. Talk to your doctor if you think you may have a problem with alcohol or drug use. This includes prescription medicines and illegal drugs. Avoid tobacco and nicotine: Don't smoke, vape, or chew. If you need help quitting, talk to your doctor. Practice safer sex. Getting tested, using condoms or dental dams, and limiting sex partners can help prevent STIs. Use birth control if it's important to you to prevent pregnancy. Talk with your doctor about your choices and what might be best for you. Prevent problems where you can.  Protect your skin from too much sun, wash your hands, brush your teeth twice a day, and wear a seat belt in the car. Where can you learn more? Go to https://chpepiceweb.[email or web address]. org and sign in to your [employer] account. Enter P072 in the [facility] box to learn more about \"Well Visit, Ages 25 to 72: Care Instructions. \"     If you do not have an account, please click on the \"Sign Up Now\" link. Current as of: March 9, 2022               Content Version: 13.4  © 9721-2303 [employer]wise, Incorporated. Care instructions adapted under license by [facility] [place] ([facility]). If you have questions about a medical condition or this instruction, always ask your healthcare professional. [address] any warranty or liability for your use of this information.

## 2022-10-11 DIAGNOSIS — R11.0 NAUSEA: ICD-10-CM

## 2022-10-11 RX ORDER — ONDANSETRON 4 MG/1
4 TABLET, FILM COATED ORAL DAILY PRN
Qty: 30 TABLET | Refills: 0 | Status: SHIPPED | OUTPATIENT
Start: 2022-10-11

## 2022-10-11 NOTE — TELEPHONE ENCOUNTER
PATIENT REQUESTING THE FOLLOWING   Please approve or refuse Rx request:  Requested Prescriptions     Pending Prescriptions Disp Refills    ondansetron (ZOFRAN) 4 MG tablet 30 tablet 0     Sig: Take 1 tablet by mouth daily as needed for Nausea or Vomiting       Next appointment:  Visit date not found

## 2023-01-15 ENCOUNTER — APPOINTMENT (OUTPATIENT)
Dept: GENERAL RADIOLOGY | Age: 53
End: 2023-01-15
Payer: COMMERCIAL

## 2023-01-15 ENCOUNTER — HOSPITAL ENCOUNTER (EMERGENCY)
Age: 53
Discharge: HOME | End: 2023-01-15
Attending: STUDENT IN AN ORGANIZED HEALTH CARE EDUCATION/TRAINING PROGRAM
Payer: COMMERCIAL

## 2023-01-15 VITALS
HEART RATE: 69 BPM | DIASTOLIC BLOOD PRESSURE: 96 MMHG | BODY MASS INDEX: 21.19 KG/M2 | TEMPERATURE: 98.5 F | SYSTOLIC BLOOD PRESSURE: 153 MMHG | RESPIRATION RATE: 14 BRPM | WEIGHT: 135 LBS | OXYGEN SATURATION: 100 % | HEIGHT: 67 IN

## 2023-01-15 DIAGNOSIS — S52.571A OTHER CLOSED INTRA-ARTICULAR FRACTURE OF DISTAL END OF RIGHT RADIUS, INITIAL ENCOUNTER: Primary | ICD-10-CM

## 2023-01-15 PROCEDURE — 99152 MOD SED SAME PHYS/QHP 5/>YRS: CPT

## 2023-01-15 PROCEDURE — 99215 OFFICE O/P EST HI 40 MIN: CPT

## 2023-01-15 PROCEDURE — 99214 OFFICE O/P EST MOD 30 MIN: CPT | Performed by: NURSE PRACTITIONER

## 2023-01-15 PROCEDURE — 2500000003 HC RX 250 WO HCPCS: Performed by: EMERGENCY MEDICINE

## 2023-01-15 PROCEDURE — 73110 X-RAY EXAM OF WRIST: CPT

## 2023-01-15 PROCEDURE — 99153 MOD SED SAME PHYS/QHP EA: CPT

## 2023-01-15 PROCEDURE — 25605 CLTX DST RDL FX/EPHYS SEP W/: CPT

## 2023-01-15 PROCEDURE — 73100 X-RAY EXAM OF WRIST: CPT

## 2023-01-15 PROCEDURE — 99285 EMERGENCY DEPT VISIT HI MDM: CPT

## 2023-01-15 PROCEDURE — 6360000002 HC RX W HCPCS: Performed by: EMERGENCY MEDICINE

## 2023-01-15 RX ORDER — HYDROCODONE BITARTRATE AND ACETAMINOPHEN 5; 325 MG/1; MG/1
1 TABLET ORAL EVERY 6 HOURS PRN
Qty: 12 TABLET | Refills: 0 | Status: SHIPPED | OUTPATIENT
Start: 2023-01-15 | End: 2023-01-18

## 2023-01-15 RX ORDER — KETAMINE HCL IN NACL, ISO-OSM 100MG/10ML
1 SYRINGE (ML) INJECTION ONCE
Status: COMPLETED | OUTPATIENT
Start: 2023-01-15 | End: 2023-01-15

## 2023-01-15 RX ORDER — PROPOFOL 10 MG/ML
1 INJECTION, EMULSION INTRAVENOUS ONCE
Status: COMPLETED | OUTPATIENT
Start: 2023-01-15 | End: 2023-01-15

## 2023-01-15 RX ADMIN — Medication 40 MG: at 14:32

## 2023-01-15 RX ADMIN — PROPOFOL 40 MG: 10 INJECTION, EMULSION INTRAVENOUS at 15:36

## 2023-01-15 ASSESSMENT — ENCOUNTER SYMPTOMS
NAUSEA: 0
VOMITING: 0
SHORTNESS OF BREATH: 0

## 2023-01-15 ASSESSMENT — PAIN - FUNCTIONAL ASSESSMENT: PAIN_FUNCTIONAL_ASSESSMENT: 0-10

## 2023-01-15 ASSESSMENT — PAIN DESCRIPTION - LOCATION: LOCATION: WRIST

## 2023-01-15 ASSESSMENT — PAIN DESCRIPTION - ORIENTATION: ORIENTATION: RIGHT

## 2023-01-15 ASSESSMENT — PAIN SCALES - GENERAL: PAINLEVEL_OUTOF10: 10

## 2023-01-15 NOTE — SEDATION DOCUMENTATION
Pt recovered to pre pretty   VSS  Dr Rene Diaz at bedside for discharge instructions and pain management

## 2023-01-15 NOTE — ED NOTES
To [facility] ER with son to drive for further evaluation.      Edith Najjar, RN  01/15/23 [id number]

## 2023-01-15 NOTE — ED PROVIDER NOTES
[facility]      CHIEF COMPLAINT       Chief Complaint   Patient presents with    Wrist Injury       Nurses Notes reviewed and I agree except as noted in the HPI. HISTORY OF PRESENT ILLNESS   Zurdo Jara is a 47 y.o. female who presents for evaluation of right wrist pain/injury. Injury occurred yesterday after patient fell while walking. Point of impact right wrist.    No head injury, loss of consciousness. Patient complains of right wrist pain, soft tissue swelling. Pain is aching, continuous. Rates 10/10. Patient unable to move her right wrist.    Patient currently followed by Dr. Jairo Romero for a right trigger finger. No improvement with current treatment. REVIEW OF SYSTEMS     Review of Systems   Constitutional:  Negative for fatigue and fever. Respiratory:  Negative for shortness of breath. Cardiovascular:  Negative for chest pain. Gastrointestinal:  Negative for nausea and vomiting. Musculoskeletal:  Positive for arthralgias and joint swelling. Skin:  Negative for rash and wound. Neurological:  Negative for numbness. Hematological:  Does not bruise/bleed easily. PAST MEDICAL HISTORY         Diagnosis Date    Acid reflux     Gouty arthritis     Hypertension        SURGICAL HISTORY     Patient  has a past surgical history that includes Inner ear surgery (2015); Finger trigger release (09/08/2021); Partial hysterectomy; Upper gastrointestinal endoscopy (10/06/2017); and Finger trigger release (Left).     CURRENT MEDICATIONS       Previous Medications    ASCORBIC ACID (VITAMIN C) 250 MG TABLET    Take 250 mg by mouth daily    CHOLECALCIFEROL (VITAMIN D) 2000 UNITS TABS TABLET    Take 1 tablet by mouth daily    FERROUS SULFATE (FE TABS) 325 (65 FE) MG EC TABLET    Take 1 tablet by mouth 2 times daily    LISINOPRIL (PRINIVIL;ZESTRIL) 30 MG TABLET    take 1 tablet by mouth once daily    MECLIZINE (ANTIVERT) 12.5 MG TABLET    Take 1 tablet by mouth 3 times daily as needed (vertigo)    MULTIPLE VITAMIN (MULTIVITAMIN ADULT PO)    Take 1 tablet by mouth daily    ONDANSETRON (ZOFRAN) 4 MG TABLET    Take 1 tablet by mouth daily as needed for Nausea or Vomiting       ALLERGIES     Patient is has No Known Allergies. FAMILY HISTORY     Patient'sfamily history includes Cancer in her mother; Heart Disease in her father. SOCIAL HISTORY     Patient  reports that she has never smoked. She has never used smokeless tobacco. She reports that she does not drink alcohol and does not use drugs. PHYSICAL EXAM     ED TRIAGE VITALS  BP: (!) 165/71, Temp: 97.6 °F (36.4 °C), Heart Rate: 89, Resp: 18, SpO2: 100 %  Physical Exam  Vitals and nursing note reviewed. Constitutional:       General: She is not in acute distress. Appearance: Normal appearance. HENT:      Head: Normocephalic and atraumatic. Right Ear: External ear normal.      Left Ear: External ear normal.      Nose: No congestion. Eyes:      General: No scleral icterus. Conjunctiva/sclera: Conjunctivae normal.   Cardiovascular:      Pulses:           Posterior tibial pulses are 2+ on the right side and 2+ on the left side. Pulmonary:      Effort: Pulmonary effort is normal. No respiratory distress. Musculoskeletal:      Right wrist: Swelling, tenderness and bony tenderness present. Decreased range of motion. Normal pulse. Left wrist: Normal.      Cervical back: Normal range of motion. Skin:     General: Skin is warm and dry. Capillary Refill: Capillary refill takes 2 to 3 seconds. Coloration: Skin is not jaundiced. Findings: No rash. Comments: Delayed cap refill to right hand   Neurological:      Mental Status: She is alert and oriented to person, place, and time. Sensory: Sensation is intact. Psychiatric:         Mood and Affect: Mood normal.         Behavior: Behavior normal. Behavior is cooperative.        DIAGNOSTIC RESULTS   Labs:No results found for this visit on 01/15/23. IMAGING:  XR WRIST RIGHT (MIN 3 VIEWS)   Final Result      Comminuted fracture of the distal radius. Final report electronically signed by Dr. Beck Rater on 1/15/2023 12:26 PM         URGENT CARE COURSE:     Vitals:    01/15/23 1200   BP: (!) 165/71   Pulse: 89   Resp: 18   Temp: 97.6 °F (36.4 °C)   SpO2: 100%   Weight: 135 lb (61.2 kg)   Height: 5' 7\" (1.702 m)       Medications - No data to display  PROCEDURES:  None  FINAL IMPRESSION      1. Other closed intra-articular fracture of distal end of right radius, initial encounter        DISPOSITION/PLAN   DISPOSITION Decision To Transfer 01/15/2023 12:42:28 PM    Patient is going to [address] ED for further evaluation of a comminuted intra-articular fracture of the right distal radius. Capillary refill delayed to the right hand. Patient states that she normally has cold fingers. Patient unable to  with her right hand. She is unable to passively extend wrist without severe pain. Patient in need of higher level of care. Patient going to [address] ED by private car. Report called to [employer]. PATIENT REFERRED TO:  [address] [facility] [address] [address] EMERGENCY DEPT  [address]    Go directly to ER. [address]  [address] [facility] [phone]    Follow up as walk in M-R 8-4. F 8-3/.     DISCHARGE MEDICATIONS:  New Prescriptions    No medications on file     Current Discharge Medication List          [address] [facility], APRN - CNP  01/15/23 2389

## 2023-01-15 NOTE — DISCHARGE INSTRUCTIONS
Make sure to [facility] [place] either tomorrow or Tuesday. Take Tylenol and ibuprofen as needed for pain. Return here if symptoms worsen.

## 2023-01-15 NOTE — ED PROVIDER NOTES
[facility] [address] [address] EMERGENCY DEPT      EMERGENCY MEDICINE     Pt Name: Génesis Markham  MRN: [id number]  [place] 1970  Date of evaluation: 1/15/2023  Provider: Miguel Jackson DO  Supervising Physician: Lilian Sanchez, [address] [place] [address]       Chief Complaint   Patient presents with    Wrist Injury     HISTORY OF PRESENT ILLNESS   Génesis Markham is a pleasant 47 y.o. female who presents to the emergency department from urgent care for evaluation of a right [address] injury. Patient tripped and fell last night. She did not hit her head or pass out. She did have pain along her right wrist and that worsened today and has been increasingly swollen. Patient went to urgent care and they sent her here for further management and evaluation. X-ray at urgent care was read as having distal right radius comminuted fracture. Patient also states that she struck her right knee last night but has been able to ambulate on it today with minimal difficulty. She has had prior surgery on her right thumb, both no pins or screws. Patient denies fever, weakness.     PASTMEDICAL HISTORY     Past Medical History:   Diagnosis Date    Acid reflux     Gouty arthritis     Hypertension        Patient Active Problem List   Diagnosis Code    Essential hypertension I10     SURGICAL HISTORY       Past Surgical History:   Procedure Laterality Date    FINGER TRIGGER RELEASE  09/08/2021    [facility]    FINGER TRIGGER RELEASE Left     INNER EAR SURGERY  2015    PARTIAL HYSTERECTOMY (CERVIX NOT REMOVED)      UPPER GASTROINTESTINAL ENDOSCOPY  10/06/2017       CURRENT MEDICATIONS       Previous Medications    ASCORBIC ACID (VITAMIN C) 250 MG TABLET    Take 250 mg by mouth daily    CHOLECALCIFEROL (VITAMIN D) 2000 UNITS TABS TABLET    Take 1 tablet by mouth daily    FERROUS SULFATE (FE TABS) 325 (65 FE) MG EC TABLET    Take 1 tablet by mouth 2 times daily    LISINOPRIL (PRINIVIL;ZESTRIL) 30 MG TABLET    take 1 tablet by mouth once daily    MECLIZINE (ANTIVERT) 12.5 MG TABLET    Take 1 tablet by mouth 3 times daily as needed (vertigo)    MULTIPLE VITAMIN (MULTIVITAMIN ADULT PO)    Take 1 tablet by mouth daily    ONDANSETRON (ZOFRAN) 4 MG TABLET    Take 1 tablet by mouth daily as needed for Nausea or Vomiting       ALLERGIES     has No Known Allergies. FAMILY HISTORY     She indicated that her mother is . She indicated that her father is . SOCIAL HISTORY       Social History     Tobacco Use    Smoking status: Never    Smokeless tobacco: Never   Vaping Use    Vaping Use: Never used   Substance Use Topics    Alcohol use: No    Drug use: No       PHYSICAL EXAM       ED Triage Vitals [01/15/23 1200]   BP Temp Temp src Heart Rate Resp SpO2 Height Weight   (!) 165/71 97.6 °F (36.4 °C) -- 89 18 100 % 5' 7\" (1.702 m) 135 lb (61.2 kg)       Physical Exam  Vitals and nursing note reviewed. Constitutional:       General: She is not in acute distress. Appearance: She is not ill-appearing or toxic-appearing. HENT:      Head: Normocephalic and atraumatic. Right Ear: External ear normal.      Left Ear: External ear normal.      Nose: Nose normal. No congestion. Mouth/Throat:      Mouth: Mucous membranes are moist.      Pharynx: Oropharynx is clear. Eyes:      Conjunctiva/sclera: Conjunctivae normal.   Cardiovascular:      Rate and Rhythm: Normal rate and regular rhythm. Pulses: Normal pulses. Heart sounds: Normal heart sounds. Pulmonary:      Effort: Pulmonary effort is normal. No respiratory distress. Breath sounds: Normal breath sounds. No wheezing. Abdominal:      General: There is no distension. Palpations: Abdomen is soft. Tenderness: There is no abdominal tenderness. There is no guarding. Musculoskeletal:         General: Normal range of motion. Cervical back: Normal range of motion and neck supple. No tenderness. Comments: Moderate amount of swelling to the right wrist, known deformity. 2+ radial pulse. Compartments are soft   Lymphadenopathy:      Cervical: No cervical adenopathy. Skin:     General: Skin is warm and dry. Capillary Refill: Capillary refill takes less than 2 seconds. Neurological:      General: No focal deficit present. Mental Status: She is alert and oriented to person, place, and time. Psychiatric:         Mood and Affect: Mood normal.       FORMAL DIAGNOSTIC RESULTS     RADIOLOGY: Interpretation per the Radiologist below, if available at the time of this note (none if blank):    XR WRIST RIGHT (2 VIEWS)   Final Result      Slightly improved alignment of comminuted fracture of the distal radius. Final report electronically signed by Dr. Rob Coffey on 1/15/2023 3:01 PM      XR WRIST RIGHT (MIN 3 VIEWS)   Final Result      Comminuted fracture of the distal radius.       Final report electronically signed by Dr. Rob Coffey on 1/15/2023 12:26 PM          LABS: (none if blank)  Labs Reviewed - No data to display    (Any cultures that may have been sent were not resulted at the time of this patient visit)    [address] [facility] [address] / ED COURSE:     1) Number and Complexity of Problems            Problem List This Visit:         Chief Complaint   Patient presents with    Wrist Injury            Differential Diagnosis includes (but not limited to):  Radius fracture        Diagnoses Considered but I have low suspicion of:   Wrist dislocation, compartment syndrome, neurovascular compromise, scaphoid fracture             Pertinent Comorbid Conditions:    N/A    2)  Data Reviewed (none if left blank)          My Independent interpretations:     EKG:      N/A    Imaging: Comminuted fracture of right distal radius, has improved based upon x-ray evaluation    Labs:      N/A                 Decision Rules/Clinical Scores utilized: N/A            External Documentation Reviewed:         Previous patient encounter documents & history available on EMR was reviewed first time seen here             See Formal Diagnostic Results above for the lab and radiology tests and orders. 3)  Treatment and Disposition         ED Reassessment: Patient is feeling better. After sedation she is alert and oriented x3         Case discussed with consulting clinician: N/A         Shared Decision-Making was performed and disposition discussed with the        Patient/Family and questions answered yes         Social determinants of health impacting treatment or disposition: N/A         Code Status: Full      Summary of Patient Presentation:      MDM  Number of Diagnoses or Management Options  Other closed intra-articular fracture of distal end of right radius, initial encounter: established, improving  Diagnosis management comments: Patient is a 69-year-old female who presents from urgent care for further management of a right distal radius fracture, sustained from a [address] injury last night. Patient mechanical fall. Vitals are stable. Compartments are soft, 2+ radial pulse, sensation intact before and after procedure. We used ketamine and propofol for sedation, and reduced with some success per x-ray, placed in a splint. Will provide her with some Norco and have her follow up with [facility].         Amount and/or Complexity of Data Reviewed  Tests in the radiology section of CPT®: ordered and reviewed  Discuss the patient with other providers: yes  Independent visualization of images, tracings, or specimens: yes    Risk of Complications, Morbidity, and/or Mortality  Presenting problems: low  Management options: low    Patient Progress  Patient progress: improved  /   Vitals Reviewed:    Vitals:    01/15/23 1452 01/15/23 1457 01/15/23 1502 01/15/23 1517   BP: (!) 158/95 (!) 158/91 (!) 158/91 (!) 153/96   Pulse: 65 59 63 69   Resp: 16 16  14   Temp: 98.3 °F (36.8 °C) 98.3 °F (36.8 °C) 98.5 °F (36.9 °C) 98.5 °F (36.9 °C)   TempSrc: Oral Oral Oral Oral   SpO2: 100% 100%  100%   Weight:       Height: The patient was seen and examined. Appropriate diagnostic testing was performed and results reviewed with the patient. The results of pertinent diagnostic studies and exam findings were discussed. The patients provisional diagnosis and plan of care were discussed with the patient and present family who expressed understanding. Any medications were reviewed and indications and risks of medications were discussed with the patient /family present. Strict verbal and written return precautions, instructions and appropriate follow-up provided to  the patient. ED Medications administered this visit:  (None if blank)  Medications   propofol injection 61 mg (has no administration in time range)   ketamine (KETALAR) injection 61.2 mg (40 mg IntraVENous Given 1/15/23 1432)         PROCEDURES: (None if blank)  Ortho Injury    Date/Time: 1/15/2023 2:52 PM  Performed by: Yessenia Marquez DO  Authorized by: Dami Soto DO   Consent: Verbal consent obtained. Written consent obtained.   Risks and benefits: risks, benefits and alternatives were discussed  Consent given by: patient  Patient understanding: patient states understanding of the procedure being performed  Patient consent: the patient's understanding of the procedure matches consent given  Procedure consent: procedure consent matches procedure scheduled  Imaging studies: imaging studies available  Patient identity confirmed: verbally with patient, hospital-assigned identification number and arm band  Injury location: wrist  Location details: right wrist  Injury type: fracture  Fracture type: distal radius  Pre-procedure neurovascular assessment: neurovascularly intact  Pre-procedure distal perfusion: normal  Pre-procedure neurological function: normal  Pre-procedure range of motion: reduced    Anesthesia:  Local anesthesia used: no    Sedation:  Patient sedated: yes  Sedatives: ketamine and propofol  Sedation start date/time: 1/15/2023 2:32 PM  Sedation end date/time: 1/15/2023 2:52 PM  Vitals: Vital signs were monitored during sedation. Manipulation performed: yes  Skin traction used: yes  Skeletal traction used: yes  Reduction successful: yes  X-ray confirmed reduction: yes  Immobilization: splint  Splint type: volar short arm  Supplies used: Ortho-Glass  Post-procedure neurovascular assessment: post-procedure neurovascularly intact  Post-procedure distal perfusion: normal  Post-procedure neurological function: normal  Post-procedure range of motion: normal  Patient tolerance: patient tolerated the procedure well with no immediate complications    :     CRITICAL CARE: (None if blank)      DISCHARGE PRESCRIPTIONS: (None if blank)  New Prescriptions    HYDROCODONE-ACETAMINOPHEN (NORCO) 5-325 MG PER TABLET    Take 1 tablet by mouth every 6 hours as needed for Pain for up to 3 days. Intended supply: 3 days. Take lowest dose possible to manage pain Max Daily Amount: 4 tablets       FINAL IMPRESSION      1. Other closed intra-articular fracture of distal end of right radius, initial encounter          DISPOSITION/PLAN   DISPOSITION Decision To Discharge 01/15/2023 02:47:19 PM      OUTPATIENT FOLLOW UP THE PATIENT:  Lashay Garcia [address]  [address] [facility] [phone]    Follow up as walk in M-R 8-4. F 8-3/. JOON Andrew CNP  [address] [place] [address]  [phone]    In 2 days  As needed    [facility] EMERGENCY DEPT  [address] [place] [address]  [phone]    If symptoms worsen      Diana Chiang, DO    This transcription was electronically signed. Parts of this transcriptions may have been dictated by use of voice recognition software and electronically transcribed, and parts may have been transcribed with the assistance of an ED scribe. The transcription may contain errors not detected in proofreading.   Please refer to my supervising physician's documentation if my documentation differs.     Electronically Signed: Carissa Andrade DO, 01/15/23, 3:33 PM      Carissa Andrade DO  Resident  01/15/23 [phone]

## 2023-01-15 NOTE — ED TRIAGE NOTES
To room with c/o right wist pain that started yesterday around 1030 am. She had a fall at [employer] parking lot. She reports tripping injury. No other injury noted. Obvious deformity.

## 2023-02-15 NOTE — TELEPHONE ENCOUNTER
Patient here for f/u BP check   Left arm 152/88   Right arm 138/68 raised to heart level    Patient did not bring in BP monitor due to not being able to locate a wristlet cuff    Please advise [Follow-Up: _____] : a [unfilled] follow-up visit [Spouse] : spouse [FreeTextEntry1] : \par

## 2023-11-20 ENCOUNTER — OFFICE VISIT (OUTPATIENT)
Dept: FAMILY MEDICINE CLINIC | Age: 53
End: 2023-11-20
Payer: COMMERCIAL

## 2023-11-20 VITALS
DIASTOLIC BLOOD PRESSURE: 82 MMHG | HEIGHT: 67 IN | OXYGEN SATURATION: 98 % | SYSTOLIC BLOOD PRESSURE: 122 MMHG | RESPIRATION RATE: 14 BRPM | TEMPERATURE: 98.3 F | BODY MASS INDEX: 19.65 KG/M2 | WEIGHT: 125.2 LBS | HEART RATE: 73 BPM

## 2023-11-20 DIAGNOSIS — Z78.0 POSTMENOPAUSE: ICD-10-CM

## 2023-11-20 DIAGNOSIS — Z00.00 ENCOUNTER FOR WELL ADULT EXAM WITHOUT ABNORMAL FINDINGS: Primary | ICD-10-CM

## 2023-11-20 DIAGNOSIS — R11.0 NAUSEA: ICD-10-CM

## 2023-11-20 DIAGNOSIS — I10 ESSENTIAL HYPERTENSION: ICD-10-CM

## 2023-11-20 DIAGNOSIS — E73.9 LACTOSE INTOLERANCE: ICD-10-CM

## 2023-11-20 DIAGNOSIS — Z13.31 DEPRESSION SCREENING NEGATIVE: ICD-10-CM

## 2023-11-20 DIAGNOSIS — Z12.31 ENCOUNTER FOR SCREENING MAMMOGRAM FOR MALIGNANT NEOPLASM OF BREAST: ICD-10-CM

## 2023-11-20 PROCEDURE — 99396 PREV VISIT EST AGE 40-64: CPT | Performed by: NURSE PRACTITIONER

## 2023-11-20 PROCEDURE — 3074F SYST BP LT 130 MM HG: CPT | Performed by: NURSE PRACTITIONER

## 2023-11-20 PROCEDURE — 3079F DIAST BP 80-89 MM HG: CPT | Performed by: NURSE PRACTITIONER

## 2023-11-20 RX ORDER — LOSARTAN POTASSIUM 50 MG/1
50 TABLET ORAL DAILY
COMMUNITY
Start: 2023-08-23

## 2023-11-20 RX ORDER — POTASSIUM CHLORIDE 1500 MG/1
20 TABLET, EXTENDED RELEASE ORAL DAILY
COMMUNITY
Start: 2023-11-08

## 2023-11-20 RX ORDER — LANOLIN ALCOHOL/MO/W.PET/CERES
400 CREAM (GRAM) TOPICAL DAILY
COMMUNITY
Start: 2023-11-08

## 2023-11-20 RX ORDER — MELATONIN
1000 DAILY
Qty: 90 TABLET | Refills: 4 | Status: SHIPPED | OUTPATIENT
Start: 2023-11-20

## 2023-11-20 RX ORDER — ONDANSETRON 4 MG/1
4 TABLET, FILM COATED ORAL DAILY PRN
Qty: 30 TABLET | Refills: 0 | Status: SHIPPED | OUTPATIENT
Start: 2023-11-20

## 2023-11-20 SDOH — ECONOMIC STABILITY: FOOD INSECURITY: WITHIN THE PAST 12 MONTHS, YOU WORRIED THAT YOUR FOOD WOULD RUN OUT BEFORE YOU GOT MONEY TO BUY MORE.: NEVER TRUE

## 2023-11-20 SDOH — ECONOMIC STABILITY: HOUSING INSECURITY
IN THE LAST 12 MONTHS, WAS THERE A TIME WHEN YOU DID NOT HAVE A STEADY PLACE TO SLEEP OR SLEPT IN A SHELTER (INCLUDING NOW)?: NO

## 2023-11-20 SDOH — ECONOMIC STABILITY: FOOD INSECURITY: WITHIN THE PAST 12 MONTHS, THE FOOD YOU BOUGHT JUST DIDN'T LAST AND YOU DIDN'T HAVE MONEY TO GET MORE.: NEVER TRUE

## 2023-11-20 SDOH — ECONOMIC STABILITY: INCOME INSECURITY: HOW HARD IS IT FOR YOU TO PAY FOR THE VERY BASICS LIKE FOOD, HOUSING, MEDICAL CARE, AND HEATING?: NOT HARD AT ALL

## 2023-11-20 ASSESSMENT — PATIENT HEALTH QUESTIONNAIRE - PHQ9
SUM OF ALL RESPONSES TO PHQ9 QUESTIONS 1 & 2: 0
DEPRESSION UNABLE TO ASSESS: FUNCTIONAL CAPACITY MOTIVATION LIMITS ACCURACY
SUM OF ALL RESPONSES TO PHQ QUESTIONS 1-9: 0
2. FEELING DOWN, DEPRESSED OR HOPELESS: 0
SUM OF ALL RESPONSES TO PHQ QUESTIONS 1-9: 0
1. LITTLE INTEREST OR PLEASURE IN DOING THINGS: 0

## 2023-11-20 ASSESSMENT — ENCOUNTER SYMPTOMS
GASTROINTESTINAL NEGATIVE: 1
RESPIRATORY NEGATIVE: 1

## 2023-11-20 NOTE — PROGRESS NOTES
Well Adult Note  Name: Carlita Mcfadden Date: 2023   MRN: [id number] Sex: Female   Age: 46 y.o. Ethnicity: Non- / Non    : 1970 Race: Elisha Ferro / Balaji Ayala is here for well adult exam.  History:      Eats a wide variety of foods  Has lactose intolerance drinks almond milk  Sleeping well at hs  No concerns with anxiety or depression  Lost her brother last year    Had an ER visit this year with heart palpitations, seeing cardiology  -on cozaar for HTN control  -Denies CP or PE or sob    Last mammogram   Last PAP , will schedule visit   Last colonoscopy  normal.     Uses nausea for intermittent nausea  -uses meclizine prn dizziness    Review of Systems   Constitutional: Negative. HENT: Negative. Respiratory: Negative. Cardiovascular: Negative. Gastrointestinal: Negative. Endocrine: Negative for polydipsia, polyphagia and polyuria. Genitourinary: Negative. Musculoskeletal: Negative. Skin: Negative. Neurological: Negative. Psychiatric/Behavioral:  Negative for self-injury and suicidal ideas.         No Known Allergies      Lab Results   Component Value Date    WBC 4.0 (L) 2023    HGB 11.8 (L) 2023    HCT 34.7 (L) 2023    MCV 82.9 2023     2023        Lab Results   Component Value Date/Time     2023 11:22 AM    K 4.0 2023 11:22 AM     2023 11:22 AM    CO2 23 2023 11:22 AM    BUN 7 2023 11:22 AM    CREATININE 0.6 2023 11:22 AM    GLUCOSE 88 2023 11:22 AM    GLUCOSE 102 2023 03:36 PM    CALCIUM 10.1 2023 11:22 AM    LABGLOM >60 2023 11:22 AM         Lab Results   Component Value Date    TSH 1.92 2022    TSHREFLEX 3.067 2023        Lab Results   Component Value Date    CHOL 202 (H) 2022    CHOL 198 2020    CHOL 207 (H) 2018     Lab Results   Component Value Date    TRIG 38 2022

## 2023-12-12 ENCOUNTER — NURSE ONLY (OUTPATIENT)
Dept: LAB | Age: 53
End: 2023-12-12

## 2023-12-12 DIAGNOSIS — I10 ESSENTIAL HYPERTENSION: ICD-10-CM

## 2023-12-12 LAB
CHOLEST SERPL-MCNC: 196 MG/DL (ref 100–199)
HDLC SERPL-MCNC: 91 MG/DL
LDLC SERPL CALC-MCNC: 96 MG/DL
TRIGL SERPL-MCNC: 45 MG/DL (ref 0–199)

## 2023-12-13 ENCOUNTER — TELEPHONE (OUTPATIENT)
Dept: FAMILY MEDICINE CLINIC | Age: 53
End: 2023-12-13

## 2023-12-13 DIAGNOSIS — R42 VERTIGO: ICD-10-CM

## 2023-12-13 RX ORDER — MECLIZINE HCL 12.5 MG/1
12.5 TABLET ORAL 3 TIMES DAILY PRN
Qty: 60 TABLET | Refills: 0 | Status: SHIPPED | OUTPATIENT
Start: 2023-12-13

## 2023-12-13 NOTE — TELEPHONE ENCOUNTER
----- Message from JOON Garcia CNP sent at 12/13/2023  7:44 AM EST -----  Let pt know her cholesterol has decreased form 202 to 196, in normal range, her LDL has also decreased from 105 to 96.  Continue current meds and daily aerobic physical activity with a high fiber low cholesterol diet, to keep cholesterol levels in normal range

## 2024-01-05 ENCOUNTER — TELEPHONE (OUTPATIENT)
Dept: FAMILY MEDICINE CLINIC | Age: 54
End: 2024-01-05

## 2024-01-05 NOTE — TELEPHONE ENCOUNTER
----- Message from JOON Albright - CNP sent at 1/5/2024 10:14 AM EST -----  Let pt know her mammogram was e for malignancy but she  may benefit by having an automated breast   ultrasound examination due to her dense breast tissue which could obscure an underlying   mass.       If agreeable I will place orders for this

## 2024-01-08 NOTE — TELEPHONE ENCOUNTER
Pt informed and verbalized understanding.     Pt will check with her ins prior to getting this ordered  Pt will call back and let us know

## 2024-02-20 ENCOUNTER — OFFICE VISIT (OUTPATIENT)
Dept: FAMILY MEDICINE CLINIC | Age: 54
End: 2024-02-20
Payer: COMMERCIAL

## 2024-02-20 VITALS
BODY MASS INDEX: 20.15 KG/M2 | HEIGHT: 67 IN | DIASTOLIC BLOOD PRESSURE: 86 MMHG | SYSTOLIC BLOOD PRESSURE: 138 MMHG | RESPIRATION RATE: 16 BRPM | HEART RATE: 71 BPM | WEIGHT: 128.4 LBS | OXYGEN SATURATION: 99 % | TEMPERATURE: 98 F

## 2024-02-20 DIAGNOSIS — R92.2 DENSE BREASTS: ICD-10-CM

## 2024-02-20 DIAGNOSIS — Z12.4 SCREENING FOR CERVICAL CANCER: ICD-10-CM

## 2024-02-20 DIAGNOSIS — Z11.51 SCREENING FOR HUMAN PAPILLOMAVIRUS (HPV): ICD-10-CM

## 2024-02-20 DIAGNOSIS — R92.30 DENSE BREASTS: ICD-10-CM

## 2024-02-20 DIAGNOSIS — Z01.419 PAP SMEAR, AS PART OF ROUTINE GYNECOLOGICAL EXAMINATION: Primary | ICD-10-CM

## 2024-02-20 PROCEDURE — 99396 PREV VISIT EST AGE 40-64: CPT | Performed by: NURSE PRACTITIONER

## 2024-02-20 PROCEDURE — 3075F SYST BP GE 130 - 139MM HG: CPT | Performed by: NURSE PRACTITIONER

## 2024-02-20 PROCEDURE — 3079F DIAST BP 80-89 MM HG: CPT | Performed by: NURSE PRACTITIONER

## 2024-02-20 ASSESSMENT — PATIENT HEALTH QUESTIONNAIRE - PHQ9
SUM OF ALL RESPONSES TO PHQ QUESTIONS 1-9: 0
SUM OF ALL RESPONSES TO PHQ QUESTIONS 1-9: 0
SUM OF ALL RESPONSES TO PHQ9 QUESTIONS 1 & 2: 0
1. LITTLE INTEREST OR PLEASURE IN DOING THINGS: 0
SUM OF ALL RESPONSES TO PHQ QUESTIONS 1-9: 0
SUM OF ALL RESPONSES TO PHQ QUESTIONS 1-9: 0
2. FEELING DOWN, DEPRESSED OR HOPELESS: 0

## 2024-02-20 NOTE — PROGRESS NOTES
adherence  Reviewed prior labs and health maintenance.  Continue current medications, diet and exercise.  Discussed use, benefit, and side effects of prescribed medications. Barriers to medication compliance addressed.   Patient given educational materials - see patient instructions.    All patient questions answered.  Patient voiced understanding.

## 2024-02-29 LAB — CYTOLOGY THIN PREP PAP: NORMAL

## 2024-03-01 ENCOUNTER — TELEPHONE (OUTPATIENT)
Dept: FAMILY MEDICINE CLINIC | Age: 54
End: 2024-03-01

## 2024-03-01 NOTE — TELEPHONE ENCOUNTER
----- Message from JOON Albright - CNP sent at 2/29/2024  5:30 PM EST -----  Let pt know her PAP test is negative for any malignancy f/u in 3 years

## 2024-11-21 ENCOUNTER — OFFICE VISIT (OUTPATIENT)
Dept: FAMILY MEDICINE CLINIC | Age: 54
End: 2024-11-21
Payer: COMMERCIAL

## 2024-11-21 VITALS
RESPIRATION RATE: 16 BRPM | OXYGEN SATURATION: 99 % | BODY MASS INDEX: 22.13 KG/M2 | SYSTOLIC BLOOD PRESSURE: 130 MMHG | DIASTOLIC BLOOD PRESSURE: 70 MMHG | WEIGHT: 141 LBS | TEMPERATURE: 97.7 F | HEIGHT: 67 IN | HEART RATE: 82 BPM

## 2024-11-21 DIAGNOSIS — R42 VERTIGO: ICD-10-CM

## 2024-11-21 DIAGNOSIS — Z00.00 ENCOUNTER FOR WELL ADULT EXAM WITHOUT ABNORMAL FINDINGS: Primary | ICD-10-CM

## 2024-11-21 DIAGNOSIS — I10 ESSENTIAL HYPERTENSION: ICD-10-CM

## 2024-11-21 DIAGNOSIS — R11.0 NAUSEA: ICD-10-CM

## 2024-11-21 PROCEDURE — 3078F DIAST BP <80 MM HG: CPT | Performed by: NURSE PRACTITIONER

## 2024-11-21 PROCEDURE — 3075F SYST BP GE 130 - 139MM HG: CPT | Performed by: NURSE PRACTITIONER

## 2024-11-21 PROCEDURE — 99396 PREV VISIT EST AGE 40-64: CPT | Performed by: NURSE PRACTITIONER

## 2024-11-21 RX ORDER — MECLIZINE HCL 12.5 MG 12.5 MG/1
12.5 TABLET ORAL 3 TIMES DAILY PRN
Qty: 60 TABLET | Refills: 0 | Status: SHIPPED | OUTPATIENT
Start: 2024-11-21

## 2024-11-21 RX ORDER — ONDANSETRON 4 MG/1
4 TABLET, FILM COATED ORAL DAILY PRN
Qty: 30 TABLET | Refills: 0 | Status: SHIPPED | OUTPATIENT
Start: 2024-11-21

## 2024-11-21 SDOH — ECONOMIC STABILITY: FOOD INSECURITY: WITHIN THE PAST 12 MONTHS, THE FOOD YOU BOUGHT JUST DIDN'T LAST AND YOU DIDN'T HAVE MONEY TO GET MORE.: NEVER TRUE

## 2024-11-21 SDOH — ECONOMIC STABILITY: INCOME INSECURITY: HOW HARD IS IT FOR YOU TO PAY FOR THE VERY BASICS LIKE FOOD, HOUSING, MEDICAL CARE, AND HEATING?: NOT HARD AT ALL

## 2024-11-21 SDOH — ECONOMIC STABILITY: FOOD INSECURITY: WITHIN THE PAST 12 MONTHS, YOU WORRIED THAT YOUR FOOD WOULD RUN OUT BEFORE YOU GOT MONEY TO BUY MORE.: NEVER TRUE

## 2024-11-21 ASSESSMENT — ENCOUNTER SYMPTOMS
GASTROINTESTINAL NEGATIVE: 1
RESPIRATORY NEGATIVE: 1

## 2024-11-21 NOTE — PROGRESS NOTES
Psychiatric/Behavioral: Negative.         No Known Allergies  Prior to Visit Medications    Medication Sig Taking? Authorizing Provider   meclizine (ANTIVERT) 12.5 MG tablet Take 1 tablet by mouth 3 times daily as needed (vertigo) Yes Justin Joya APRN - CNP   ondansetron (ZOFRAN) 4 MG tablet Take 1 tablet by mouth daily as needed for Nausea or Vomiting Yes Justin Joya APRN - CNP   losartan (COZAAR) 50 MG tablet Take 1 tablet by mouth daily Yes Tiarra Hayward MD   magnesium oxide (MAG-OX) 400 (240 Mg) MG tablet Take 1 tablet by mouth daily Yes Tiarra Hayward MD   potassium chloride (KLOR-CON M) 20 MEQ TBCR extended release tablet Take 1 tablet by mouth daily with food Yes Tiarra Hayward MD   vitamin D3 (CHOLECALCIFEROL) 25 MCG (1000 UT) TABS tablet Take 1 tablet by mouth daily Yes Justin Joya APRN - CNP   Ascorbic Acid (VITAMIN C) 250 MG tablet Take 1 tablet by mouth daily Yes Tiarra Hayward MD   Multiple Vitamin (MULTIVITAMIN ADULT PO) Take 1 tablet by mouth daily Yes Tiarra Hawyard MD   ferrous sulfate (FE TABS) 325 (65 Fe) MG EC tablet Take 1 tablet by mouth 2 times daily Yes Justin Joya APRN - CNP     Past Medical History:   Diagnosis Date    Acid reflux     Gouty arthritis     Hypertension      Past Surgical History:   Procedure Laterality Date    FINGER TRIGGER RELEASE  09/08/2021    [facility]    FINGER TRIGGER RELEASE Left     INNER EAR SURGERY  2015    PARTIAL HYSTERECTOMY (CERVIX NOT REMOVED)      UPPER GASTROINTESTINAL ENDOSCOPY  10/06/2017     Family History   Problem Relation Age of Onset    Cancer Mother     Heart Disease Father      Social History     Tobacco Use    Smoking status: Never    Smokeless tobacco: Never   Vaping Use    Vaping status: Never Used   Substance Use Topics    Alcohol use: No    Drug use: No           Objective    Vital Signs  /70   Pulse 82   Temp 97.7 °F (36.5 °C)   Resp 16   Ht 1.702 m (5' 7\")   Wt 64 kg (141 lb)   SpO2

## 2024-11-21 NOTE — PATIENT INSTRUCTIONS
hands, brush your teeth twice a day, and wear a seat belt in the car.   Where can you learn more?  Go to https://www.[employer].net/patientEd and enter P072 to learn more about \"Well Visit, Ages 18 to 65: Care Instructions.\"  Current as of: August 6, 2023  Content Version: 14.2  © 2024 [employer].   Care instructions adapted under license by [employer]. If you have questions about a medical condition or this instruction, always ask your healthcare professional. [employer] disclaims any warranty or liability for your use of this information.

## 2024-11-22 ENCOUNTER — LAB (OUTPATIENT)
Dept: LAB | Age: 54
End: 2024-11-22

## 2024-11-22 DIAGNOSIS — R42 VERTIGO: ICD-10-CM

## 2024-11-22 DIAGNOSIS — Z00.00 ENCOUNTER FOR WELL ADULT EXAM WITHOUT ABNORMAL FINDINGS: ICD-10-CM

## 2024-11-22 DIAGNOSIS — R11.0 NAUSEA: ICD-10-CM

## 2024-11-22 DIAGNOSIS — I10 ESSENTIAL HYPERTENSION: ICD-10-CM

## 2024-11-22 LAB
ALBUMIN SERPL BCG-MCNC: 4.7 G/DL (ref 3.5–5.1)
ALP SERPL-CCNC: 91 U/L (ref 38–126)
ALT SERPL W/O P-5'-P-CCNC: 9 U/L (ref 11–66)
ANION GAP SERPL CALC-SCNC: 10 MEQ/L (ref 8–16)
AST SERPL-CCNC: 18 U/L (ref 5–40)
BASOPHILS ABSOLUTE: 0 THOU/MM3 (ref 0–0.1)
BASOPHILS NFR BLD AUTO: 0.9 %
BILIRUB SERPL-MCNC: 0.3 MG/DL (ref 0.3–1.2)
BUN SERPL-MCNC: 10 MG/DL (ref 7–22)
CALCIUM SERPL-MCNC: 10.3 MG/DL (ref 8.5–10.5)
CHLORIDE SERPL-SCNC: 105 MEQ/L (ref 98–111)
CHOLEST SERPL-MCNC: 198 MG/DL (ref 100–199)
CO2 SERPL-SCNC: 28 MEQ/L (ref 23–33)
CREAT SERPL-MCNC: 0.5 MG/DL (ref 0.4–1.2)
DEPRECATED RDW RBC AUTO: 46.1 FL (ref 35–45)
EOSINOPHIL NFR BLD AUTO: 2.3 %
EOSINOPHILS ABSOLUTE: 0.1 THOU/MM3 (ref 0–0.4)
ERYTHROCYTE [DISTWIDTH] IN BLOOD BY AUTOMATED COUNT: 14.4 % (ref 11.5–14.5)
GFR SERPL CREATININE-BSD FRML MDRD: > 90 ML/MIN/1.73M2
GLUCOSE SERPL-MCNC: 86 MG/DL (ref 70–108)
HCT VFR BLD AUTO: 37.8 % (ref 37–47)
HDLC SERPL-MCNC: 91 MG/DL
HGB BLD-MCNC: 11.8 GM/DL (ref 12–16)
IMM GRANULOCYTES # BLD AUTO: 0.01 THOU/MM3 (ref 0–0.07)
IMM GRANULOCYTES NFR BLD AUTO: 0.3 %
LDLC SERPL CALC-MCNC: 99 MG/DL
LYMPHOCYTES ABSOLUTE: 1.8 THOU/MM3 (ref 1–4.8)
LYMPHOCYTES NFR BLD AUTO: 52 %
MCH RBC QN AUTO: 27.4 PG (ref 26–33)
MCHC RBC AUTO-ENTMCNC: 31.2 GM/DL (ref 32.2–35.5)
MCV RBC AUTO: 87.9 FL (ref 81–99)
MONOCYTES ABSOLUTE: 0.3 THOU/MM3 (ref 0.4–1.3)
MONOCYTES NFR BLD AUTO: 9.6 %
NEUTROPHILS ABSOLUTE: 1.2 THOU/MM3 (ref 1.8–7.7)
NEUTROPHILS NFR BLD AUTO: 34.9 %
NRBC BLD AUTO-RTO: 0 /100 WBC
PLATELET # BLD AUTO: 316 THOU/MM3 (ref 130–400)
PMV BLD AUTO: 9.2 FL (ref 9.4–12.4)
POTASSIUM SERPL-SCNC: 4.6 MEQ/L (ref 3.5–5.2)
PROT SERPL-MCNC: 8.1 G/DL (ref 6.1–8)
RBC # BLD AUTO: 4.3 MILL/MM3 (ref 4.2–5.4)
SODIUM SERPL-SCNC: 143 MEQ/L (ref 135–145)
TRIGL SERPL-MCNC: 38 MG/DL (ref 0–199)
TSH SERPL DL<=0.005 MIU/L-ACNC: 2.8 UIU/ML (ref 0.4–4.2)
WBC # BLD AUTO: 3.4 THOU/MM3 (ref 4.8–10.8)

## 2024-11-25 ENCOUNTER — TELEPHONE (OUTPATIENT)
Dept: FAMILY MEDICINE CLINIC | Age: 54
End: 2024-11-25

## 2024-11-25 DIAGNOSIS — E87.6 HYPOKALEMIA: Primary | ICD-10-CM

## 2024-11-25 NOTE — TELEPHONE ENCOUNTER
----- Message from JOON Albright - CNP sent at 11/25/2024 10:19 AM EST -----  Let pt know renal and liver function and TSH are normal, CBC stable, K++ a little low , recommend repeat in 1 week to see if transient of if it continues. Lipid panel stable, orders paled for repeat electrolyte panel let know   if questions.

## 2024-11-25 NOTE — TELEPHONE ENCOUNTER
Left message on answering machine. Requested pt to call back at [phone], at their earliest convenience.

## 2024-12-06 ENCOUNTER — LAB (OUTPATIENT)
Dept: LAB | Age: 54
End: 2024-12-06

## 2024-12-06 DIAGNOSIS — E87.6 HYPOKALEMIA: ICD-10-CM

## 2024-12-06 LAB
ANION GAP SERPL CALC-SCNC: 18 MEQ/L (ref 8–16)
CHLORIDE SERPL-SCNC: 105 MEQ/L (ref 98–111)
CO2 SERPL-SCNC: 24 MEQ/L (ref 23–33)
POTASSIUM SERPL-SCNC: 4.6 MEQ/L (ref 3.5–5.2)
SODIUM SERPL-SCNC: 147 MEQ/L (ref 135–145)

## 2024-12-09 ENCOUNTER — TELEPHONE (OUTPATIENT)
Dept: FAMILY MEDICINE CLINIC | Age: 54
End: 2024-12-09

## 2024-12-09 NOTE — TELEPHONE ENCOUNTER
----- Message from JOON Albright - CNP sent at 12/9/2024  2:57 PM EST -----  Let pt know labs are stable, potassium is normal, sodium stable

## 2025-01-28 ENCOUNTER — OFFICE VISIT (OUTPATIENT)
Dept: FAMILY MEDICINE CLINIC | Age: 55
End: 2025-01-28
Payer: COMMERCIAL

## 2025-01-28 VITALS
TEMPERATURE: 98 F | BODY MASS INDEX: 20.91 KG/M2 | RESPIRATION RATE: 16 BRPM | DIASTOLIC BLOOD PRESSURE: 82 MMHG | SYSTOLIC BLOOD PRESSURE: 136 MMHG | OXYGEN SATURATION: 98 % | WEIGHT: 133.2 LBS | HEIGHT: 67 IN | HEART RATE: 72 BPM

## 2025-01-28 DIAGNOSIS — I10 ESSENTIAL HYPERTENSION: ICD-10-CM

## 2025-01-28 DIAGNOSIS — R06.2 WHEEZING: ICD-10-CM

## 2025-01-28 DIAGNOSIS — R05.1 ACUTE COUGH: ICD-10-CM

## 2025-01-28 DIAGNOSIS — J40 BRONCHITIS: Primary | ICD-10-CM

## 2025-01-28 PROCEDURE — 3079F DIAST BP 80-89 MM HG: CPT | Performed by: NURSE PRACTITIONER

## 2025-01-28 PROCEDURE — 99214 OFFICE O/P EST MOD 30 MIN: CPT | Performed by: NURSE PRACTITIONER

## 2025-01-28 PROCEDURE — 3075F SYST BP GE 130 - 139MM HG: CPT | Performed by: NURSE PRACTITIONER

## 2025-01-28 RX ORDER — PREDNISONE 20 MG/1
20 TABLET ORAL 2 TIMES DAILY
Qty: 6 TABLET | Refills: 0 | Status: SHIPPED | OUTPATIENT
Start: 2025-01-28 | End: 2025-01-31

## 2025-01-28 RX ORDER — BENZONATATE 200 MG/1
200 CAPSULE ORAL 3 TIMES DAILY PRN
Qty: 60 CAPSULE | Refills: 0 | Status: SHIPPED | OUTPATIENT
Start: 2025-01-28 | End: 2025-02-07

## 2025-01-28 RX ORDER — ALBUTEROL SULFATE 90 UG/1
2 INHALANT RESPIRATORY (INHALATION) EVERY 6 HOURS PRN
Qty: 18 G | Refills: 3 | Status: SHIPPED | OUTPATIENT
Start: 2025-01-28

## 2025-01-28 SDOH — ECONOMIC STABILITY: FOOD INSECURITY: WITHIN THE PAST 12 MONTHS, YOU WORRIED THAT YOUR FOOD WOULD RUN OUT BEFORE YOU GOT MONEY TO BUY MORE.: NEVER TRUE

## 2025-01-28 SDOH — ECONOMIC STABILITY: FOOD INSECURITY: WITHIN THE PAST 12 MONTHS, THE FOOD YOU BOUGHT JUST DIDN'T LAST AND YOU DIDN'T HAVE MONEY TO GET MORE.: NEVER TRUE

## 2025-01-28 ASSESSMENT — PATIENT HEALTH QUESTIONNAIRE - PHQ9
SUM OF ALL RESPONSES TO PHQ QUESTIONS 1-9: 0
1. LITTLE INTEREST OR PLEASURE IN DOING THINGS: NOT AT ALL
SUM OF ALL RESPONSES TO PHQ9 QUESTIONS 1 & 2: 0
2. FEELING DOWN, DEPRESSED OR HOPELESS: NOT AT ALL
SUM OF ALL RESPONSES TO PHQ QUESTIONS 1-9: 0

## 2025-01-28 ASSESSMENT — ENCOUNTER SYMPTOMS
SORE THROAT: 0
COUGH: 1
VOICE CHANGE: 0
TROUBLE SWALLOWING: 0
SINUS PRESSURE: 0
CHEST TIGHTNESS: 1
RHINORRHEA: 1
SINUS PAIN: 0
WHEEZING: 1

## 2025-01-28 NOTE — PROGRESS NOTES
[facility]  [facility]  [address]  [place] [address]  Dept: [phone]  Dept Fax: [phone]  Loc: [phone]    Oanh Lopez is a 54 y.o. femalewho presents today for her medical conditions/complaints as noted below.  Oanh Lopezis c/o of Cough (Fever, fatigue, body aches- started 1/20/25)      :     HPI    Cough    HPI:  Symptoms have been present for 7 day(s).  Cough Description - productive of clear sputum, with wheezing, chest is painful during coughing, paroxysmal, waxing and waning over time  Symptoms preceded by URI?  Yes - states she had a fever of 102 for 3 days and body aches and chills when the cough started but fever and body aches and chills are gone now had a negative test for COVID at that time   Provoking factors - activity  Alleviating factors - nothing   SOB or wheezing?  Yes - feels chest tightness and fatigued eating less   Nasal congestion, rhinitis?  Yes  GERD Sx?  No  Tobacco use?   No  Voice changes or dysphagia?  No  Treatments tried - otc meds      PMH: HTN    HTN  -taking cozaar 50 mg daily  -Denies CP or SOB or PE  -follows with cardiology  -checks BP at home and has 130/70 range       Pt uses zofran and meclizine prn for Nausea and dizziness no change in symptoms     Last colonoscopy 8/2019  5 year recall waiting to schedule   Lat mammogram 1/2024  Last PAP 2/2024      Lab Results   Component Value Date    WBC 3.4 (L) 11/22/2024    HGB 11.8 (L) 11/22/2024    HCT 37.8 11/22/2024    MCV 87.9 11/22/2024     11/22/2024      Lab Results   Component Value Date     (H) 12/06/2024    K 4.6 12/06/2024     12/06/2024    CO2 24 12/06/2024    BUN 10 11/22/2024    CREATININE 0.5 11/22/2024    GLUCOSE 86 11/22/2024    CALCIUM 10.3 11/22/2024    BILITOT 0.3 11/22/2024    ALKPHOS 91 11/22/2024    AST 18 11/22/2024    ALT 9 (L) 11/22/2024    LABGLOM > 90 11/22/2024        Lab Results   Component Value Date    CHOL